# Patient Record
Sex: MALE | Race: BLACK OR AFRICAN AMERICAN | Employment: FULL TIME | URBAN - METROPOLITAN AREA
[De-identification: names, ages, dates, MRNs, and addresses within clinical notes are randomized per-mention and may not be internally consistent; named-entity substitution may affect disease eponyms.]

---

## 2019-07-25 ENCOUNTER — OFFICE VISIT (OUTPATIENT)
Dept: FAMILY MEDICINE CLINIC | Age: 34
End: 2019-07-25
Payer: COMMERCIAL

## 2019-07-25 VITALS
WEIGHT: 238 LBS | HEART RATE: 79 BPM | TEMPERATURE: 98.5 F | DIASTOLIC BLOOD PRESSURE: 68 MMHG | SYSTOLIC BLOOD PRESSURE: 110 MMHG | HEIGHT: 67 IN | RESPIRATION RATE: 16 BRPM | BODY MASS INDEX: 37.35 KG/M2

## 2019-07-25 DIAGNOSIS — A09 TRAVELER'S DIARRHEA: ICD-10-CM

## 2019-07-25 DIAGNOSIS — R10.11 RUQ PAIN: Primary | ICD-10-CM

## 2019-07-25 DIAGNOSIS — R10.11 RUQ PAIN: ICD-10-CM

## 2019-07-25 LAB
A/G RATIO: 1.3 (ref 1.1–2.2)
ALBUMIN SERPL-MCNC: 3.9 G/DL (ref 3.4–5)
ALP BLD-CCNC: 88 U/L (ref 40–129)
ALT SERPL-CCNC: 19 U/L (ref 10–40)
ANION GAP SERPL CALCULATED.3IONS-SCNC: 13 MMOL/L (ref 3–16)
AST SERPL-CCNC: 14 U/L (ref 15–37)
BASOPHILS ABSOLUTE: 0.1 K/UL (ref 0–0.2)
BASOPHILS RELATIVE PERCENT: 0.9 %
BILIRUB SERPL-MCNC: <0.2 MG/DL (ref 0–1)
BUN BLDV-MCNC: 15 MG/DL (ref 7–20)
CALCIUM SERPL-MCNC: 9.4 MG/DL (ref 8.3–10.6)
CHLORIDE BLD-SCNC: 103 MMOL/L (ref 99–110)
CO2: 24 MMOL/L (ref 21–32)
CREAT SERPL-MCNC: 0.9 MG/DL (ref 0.9–1.3)
EOSINOPHILS ABSOLUTE: 0.2 K/UL (ref 0–0.6)
EOSINOPHILS RELATIVE PERCENT: 1.9 %
GFR AFRICAN AMERICAN: >60
GFR NON-AFRICAN AMERICAN: >60
GLOBULIN: 3.1 G/DL
GLUCOSE BLD-MCNC: 92 MG/DL (ref 70–99)
HCT VFR BLD CALC: 43.2 % (ref 40.5–52.5)
HEMOGLOBIN: 14.4 G/DL (ref 13.5–17.5)
LYMPHOCYTES ABSOLUTE: 1.3 K/UL (ref 1–5.1)
LYMPHOCYTES RELATIVE PERCENT: 14.5 %
MCH RBC QN AUTO: 29.4 PG (ref 26–34)
MCHC RBC AUTO-ENTMCNC: 33.2 G/DL (ref 31–36)
MCV RBC AUTO: 88.6 FL (ref 80–100)
MONOCYTES ABSOLUTE: 1.2 K/UL (ref 0–1.3)
MONOCYTES RELATIVE PERCENT: 13.1 %
NEUTROPHILS ABSOLUTE: 6.4 K/UL (ref 1.7–7.7)
NEUTROPHILS RELATIVE PERCENT: 69.6 %
PDW BLD-RTO: 13.1 % (ref 12.4–15.4)
PLATELET # BLD: 326 K/UL (ref 135–450)
PMV BLD AUTO: 8.6 FL (ref 5–10.5)
POTASSIUM SERPL-SCNC: 3.7 MMOL/L (ref 3.5–5.1)
RBC # BLD: 4.88 M/UL (ref 4.2–5.9)
SODIUM BLD-SCNC: 140 MMOL/L (ref 136–145)
TOTAL PROTEIN: 7 G/DL (ref 6.4–8.2)
WBC # BLD: 9.2 K/UL (ref 4–11)

## 2019-07-25 PROCEDURE — 99213 OFFICE O/P EST LOW 20 MIN: CPT | Performed by: FAMILY MEDICINE

## 2019-07-25 RX ORDER — CIPROFLOXACIN 500 MG/1
500 TABLET, FILM COATED ORAL 2 TIMES DAILY
Qty: 14 TABLET | Refills: 0 | Status: SHIPPED | OUTPATIENT
Start: 2019-07-25 | End: 2019-08-01

## 2019-07-25 ASSESSMENT — PATIENT HEALTH QUESTIONNAIRE - PHQ9
SUM OF ALL RESPONSES TO PHQ QUESTIONS 1-9: 0
2. FEELING DOWN, DEPRESSED OR HOPELESS: 0
1. LITTLE INTEREST OR PLEASURE IN DOING THINGS: 0
SUM OF ALL RESPONSES TO PHQ QUESTIONS 1-9: 0
SUM OF ALL RESPONSES TO PHQ9 QUESTIONS 1 & 2: 0

## 2019-07-25 NOTE — PROGRESS NOTES
tablet by mouth 2 times daily for 7 days Yes [place], MD   ibuprofen (ADVIL;MOTRIN) 800 MG tablet Take 1 tablet by mouth every 8 hours as needed for Pain Yes Luci Shukla PA-C      No family history on file. Social History     Tobacco Use    Smoking status: Never Smoker    Smokeless tobacco: Never Used    Tobacco comment: advised not to start   Substance Use Topics    Alcohol use: Not on file    Drug use: No      LAST LABS  No results found for: CHOL, LDLCALCNo results found for: HDLNo results found for: TRIG  No results found for: GLUCOSE  No results found for: NA, K, CREATININE  No results found for: WBC, HGB, HCT, MCV, PLT  No results found for: ALT, AST, GGT, ALKPHOS, BILITOT  No results found for: TSH  No results found for: LABA1C   Objective:    PHYSICAL EXAM   /68 (Site: Right Upper Arm, Position: Sitting, Cuff Size: Large Adult)   Pulse 79   Temp 98.5 °F (36.9 °C) (Oral)   Resp 16   Ht 5' 7\" (1.702 m)   Wt 238 lb (108 kg)   BMI 37.28 kg/m²   BP Readings from Last 5 Encounters:   07/25/19 110/68   02/12/18 (!) 142/76   11/04/15 120/70   09/16/15 112/70   09/03/14 118/76     Wt Readings from Last 5 Encounters:   07/25/19 238 lb (108 kg)   02/12/18 227 lb 15.3 oz (103.4 kg)   11/04/15 223 lb (101.2 kg)   09/16/15 220 lb (99.8 kg)   09/03/14 224 lb (101.6 kg)      GENERAL:   · well-developed, well-nourished, alert, no distress. EYES:   · External findings: lids and lashes normal and conjunctivae and sclerae normal  · Eyes: no periorbital cellulitis. ENT:   · External nose and ears appear normal  LUNGS:    · Breathing unlabored  · clear to auscultation bilaterally and good air movement  CARDIOVASC:   · regular rate and rhythm, S1, S2 normal. No murmurs noted. · Apical impulse normal  ABDOMEN:   · Soft, mild-tender RUQ, no masses  · No hepatosplenomegaly  · No hernias noted.     SKIN: warm and dry  · No rashes or suspicious lesions  · No nodules or induration     Assessment and Plan:

## 2019-07-26 LAB
REASON FOR REJECTION: NORMAL
REJECTED TEST: NORMAL

## 2019-07-29 ENCOUNTER — TELEPHONE (OUTPATIENT)
Dept: FAMILY MEDICINE CLINIC | Age: 34
End: 2019-07-29

## 2019-07-29 NOTE — TELEPHONE ENCOUNTER
Called pt and he said since taking the antibiotics he is feeling 100% better.   He will finish the antibiotic and if he starts feeling sick when it's done he will call us back and repeat blood test.

## 2019-08-29 PROBLEM — K63.3 ULCERATION OF COLON: Status: ACTIVE | Noted: 2019-08-29

## 2020-02-12 ENCOUNTER — TELEPHONE (OUTPATIENT)
Dept: FAMILY MEDICINE CLINIC | Age: 35
End: 2020-02-12

## 2020-12-07 ENCOUNTER — TELEPHONE (OUTPATIENT)
Dept: FAMILY MEDICINE CLINIC | Age: 35
End: 2020-12-07

## 2020-12-07 NOTE — TELEPHONE ENCOUNTER
Called pt and he said his sperm is a pinkish color, it has been this way for a month or so. He isn't really sure. Pt is having no pain. He has no burning or pain at all. No blood in his urine or anything only when he ejaculates.

## 2020-12-07 NOTE — TELEPHONE ENCOUNTER
Pt calling states he has a concern and would a like a call from Dr. Jorge Vides   No other information given   Please advise

## 2020-12-07 NOTE — TELEPHONE ENCOUNTER
This can be a benign condition but best to see urology for evaluation. Edyta Sequeira M.D. Axel Virgen M.D. Hi Hernandez M.D.  Ktahe Mcdowell M.D. [facility]. Victoria Pisano M.D. [facility]  [id number] [address]  [phone] (Phone)  [phone] (Fax)

## 2021-02-03 ENCOUNTER — OFFICE VISIT (OUTPATIENT)
Dept: FAMILY MEDICINE CLINIC | Age: 36
End: 2021-02-03
Payer: COMMERCIAL

## 2021-02-03 VITALS
SYSTOLIC BLOOD PRESSURE: 112 MMHG | HEIGHT: 67 IN | BODY MASS INDEX: 37.2 KG/M2 | WEIGHT: 237 LBS | TEMPERATURE: 98.4 F | HEART RATE: 78 BPM | OXYGEN SATURATION: 98 % | DIASTOLIC BLOOD PRESSURE: 68 MMHG | RESPIRATION RATE: 16 BRPM

## 2021-02-03 DIAGNOSIS — Z13.1 SCREENING FOR DIABETES MELLITUS: ICD-10-CM

## 2021-02-03 DIAGNOSIS — Z00.00 WELL ADULT HEALTH CHECK: Primary | ICD-10-CM

## 2021-02-03 DIAGNOSIS — Z13.220 SCREENING, LIPID: ICD-10-CM

## 2021-02-03 DIAGNOSIS — R06.83 SNORING: ICD-10-CM

## 2021-02-03 DIAGNOSIS — Z11.59 NEED FOR HEPATITIS C SCREENING TEST: ICD-10-CM

## 2021-02-03 DIAGNOSIS — K50.90 CROHN'S DISEASE WITHOUT COMPLICATION, UNSPECIFIED GASTROINTESTINAL TRACT LOCATION (HCC): ICD-10-CM

## 2021-02-03 DIAGNOSIS — R40.0 DAYTIME SOMNOLENCE: ICD-10-CM

## 2021-02-03 PROCEDURE — 99395 PREV VISIT EST AGE 18-39: CPT | Performed by: FAMILY MEDICINE

## 2021-02-03 PROCEDURE — G8484 FLU IMMUNIZE NO ADMIN: HCPCS | Performed by: FAMILY MEDICINE

## 2021-02-03 SDOH — ECONOMIC STABILITY: FOOD INSECURITY: WITHIN THE PAST 12 MONTHS, YOU WORRIED THAT YOUR FOOD WOULD RUN OUT BEFORE YOU GOT MONEY TO BUY MORE.: PATIENT DECLINED

## 2021-02-03 ASSESSMENT — PATIENT HEALTH QUESTIONNAIRE - PHQ9: 2. FEELING DOWN, DEPRESSED OR HOPELESS: 0

## 2021-02-03 NOTE — PROGRESS NOTES
PHYSICAL-VISIT NOTE   Subjective:     Chief Complaint   Patient presents with    Annual Exam       Александр Montes is a 28 y.o. male who presents for annual testing/preventive review and check-up of medical problems listed below:  1. Well adult health check    2. Need for hepatitis C screening test    3. Screening for diabetes mellitus    4. Screening, lipid    5. Daytime somnolence    6. Snoring    7. Crohn's disease without complication, unspecified gastrointestinal tract location [facility])        Complaints: pt is doing well no new issues   Health Maintenance Due   Topic Date Due    Hepatitis C screen  1985     Has new insurance so may be able to pursue Tx for Crohn's. Last flare 6 weeks ago. Has yellow stones in back of throat. He scrapes them off. Sleep non-restorative. Told he snores. ROS  See scanned \"Annual Adult Health Checklist\" reviewed by Lonia Dance, MD. Pertinent positives addressed above. *Chief complaint, HPI and History provided by the medical assistant has been reviewed and verified by provider- Lonia Dance MD.    HISTORY:  Patient's medications, allergies, past medical, and social histories were reviewed and updated as appropriate. CHART REVIEW  Health Maintenance   Topic Date Due    Hepatitis C screen  1985    Flu vaccine (1) 02/03/2022 (Originally 9/1/2020)    DTaP/Tdap/Td vaccine (2 - Td) 09/03/2024    Hepatitis A vaccine  Aged Out    Hepatitis B vaccine  Aged Out    Hib vaccine  Aged Out    Meningococcal (ACWY) vaccine  Aged Out    Pneumococcal 0-64 years Vaccine  Aged Out    Varicella vaccine  Discontinued    HIV screen  Discontinued     The ASCVD Risk score (Shea Couch, et al., 2013) failed to calculate for the following reasons: The 2013 ASCVD risk score is only valid for ages 36 to 78  Prior to Visit Medications    Not on File      No family history on file.   Social History     Tobacco Use    Smoking status: Never Smoker  Smokeless tobacco: Never Used    Tobacco comment: advised not to start   Substance Use Topics    Alcohol use: Not on file    Drug use: No      LAST LABS  No results found for: CHOL, LDLCALCNo results found for: HDLNo results found for: TRIG  Lab Results   Component Value Date    GLUCOSE 92 07/25/2019     Lab Results   Component Value Date     07/25/2019    K 3.7 07/25/2019    CREATININE 0.9 07/25/2019     Lab Results   Component Value Date    WBC 9.2 07/25/2019    HGB 14.4 07/25/2019    HCT 43.2 07/25/2019    MCV 88.6 07/25/2019     07/25/2019     Lab Results   Component Value Date    ALT 19 07/25/2019    AST 14 (L) 07/25/2019    ALKPHOS 88 07/25/2019    BILITOT <0.2 07/25/2019     No results found for: TSH  No results found for: LABA1C  Objective:   PHYSICAL EXAM   /68 (Site: Right Upper Arm, Position: Sitting, Cuff Size: Large Adult)   Pulse 78   Temp 98.4 °F (36.9 °C) (Temporal)   Resp 16   Ht 5' 7\" (1.702 m)   Wt 237 lb (107.5 kg)   SpO2 98%   BMI 37.12 kg/m²   BP Readings from Last 5 Encounters:   02/03/21 112/68   07/25/19 110/68   02/12/18 (!) 142/76   11/04/15 120/70   09/16/15 112/70     Wt Readings from Last 5 Encounters:   02/03/21 237 lb (107.5 kg)   07/25/19 238 lb (108 kg)   02/12/18 227 lb 15.3 oz (103.4 kg)   11/04/15 223 lb (101.2 kg)   09/16/15 220 lb (99.8 kg)      GENERAL:   · well-developed, well-nourished, alert, no distress. EYES:   · External findings: lids and lashes normal and conjunctivae and sclerae normal  · Eyes: no periorbital cellulitis.   ENT:   · External nose and ears appear normal  · normal TM's and external ear canals both ears  · Pharynx: normal. Exudates: None  · Lips, mucosa, and tongue normal  · Hearing grossly normal.     NECK:   · Supple, symmetrical, trachea midline  · Thyroid not enlarged, symmetric, no tenderness/mass/nodules  LYMPH:  · no cervical nodes, no supraclavicular nodes  LUNGS:    · Breathing unlabored

## 2021-02-03 NOTE — PATIENT INSTRUCTIONS
INSTRUCTIONS  · NEXT APPOINTMENT: Please schedule annual complete physical (30 minutes) in one year. · PLEASE TAKE THIS FORM TO CHECK-OUT WINDOW TO SCHEDULE NEXT VISIT. PLEASE GET FASTING BLOODWORK DRAWN SOON. Lab is on first floor in [address]. Hours Monday to Friday 7 AM to 5 PM.   · See GI (Dr. Chelsy Scott) soon to discuss treatment options. · If tonsil stones get too irritating can refer to ENT for tonsillectomy. · See sleep specialist.  · For allergies, patient may take OTC antihistamines such as Claritin/Allovert/loratidine or Zyrtec/certrizine 10 mg daily. If that does not work, add daily Flonase or Nasacort nasal spray. Patient Education       SLEEP APNEA    Overview   What is sleep apnea? Sleep apnea is a serious sleep disorder. People who have sleep apnea stop breathing for 10 seconds to 30 seconds at a time while they are sleeping. These short stops in breathing can happen up to 400 times every night. If you have sleep apnea, periods of not breathing can disturb your sleep (even if they don't fully wake you up). Is sleep apnea common? It is estimated that more than 12 million Americans have sleep apnea. Men, people who are overweight, and people who are older than 36years of age are more likely to have sleep apnea. However, it can affect anyone at any age. If you are interested in meeting other people who have sleep apnea, you can visit the American Sleep Apnea Association's website to find the location of a support group near you. Will this problem change my life? Actually, sleep apnea may already have affected you more than you know. Chances are things will improve for you once the diagnosis is made and you start treatment. Whatever your treatment, remember that you are not alone and help is available. Symptoms   How do I know if I have sleep apnea? Because some of the symptoms of sleep apnea occur while you're sleeping, your bed partner may notice it first. You, or that person, may notice heavy snoring or long pauses in your breathing during sleep. Even if you don't remember waking up during the night, you may notice daytime sleepiness (such as falling asleep at work, while driving or when talking), irritability or fatigue. You may also experience morning headaches, forgetfulness, mood changes and a decreased interest in sex. Causes & Risk Factors   What causes sleep apnea? There are 2 kinds of sleep apnea: obstructive apnea and central apnea. Obstructive sleep apnea is the most common type. Nine out of 10 people who have sleep apnea have this type of apnea. If you have obstructive apnea, something is blocking the airway that brings air into your body (also called the trachea). When you try to breathe, you can't get enough air because of the blockage. Your airway might be blocked by your tongue, tonsils or uvula (the little piece of flesh that hangs down in the back of your throat). It might also be blocked by a large amount of fatty tissue in the throat or by relaxed throat muscles. Central sleep apnea is less common. This type of sleep apnea is related to the function of the central nervous system. If you have this type of apnea, the muscles you use to breathe don't get the \"go-ahead\" signal from your brain. Either the brain doesn't send the signal, or the signal gets interrupted. Diagnosis & Tests   How is sleep apnea diagnosed? Crohn's disease is a lifelong inflammatory bowel disease. Parts of the digestive tract get swollen and irritated and may develop sores called ulcers. It usually occurs in the last part of the small intestine and the first part of the large intestine. The main symptoms of Crohn's disease are belly pain, diarrhea, fever, and weight loss. It sometimes causes problems with joints, eyes, or skin. Symptoms may be mild or severe. The disease can also go into remission (not be active). Bad attacks are often treated in the hospital with medicines and liquids through a tube in your vein (IV). Talk with your doctor about the best treatments for you. You may need medicines that help prevent or treat flare-ups. You may need surgery to remove part of your bowel if you have an abnormal opening in the bowel (fistula), an abscess, or an obstruction. Self-care can help reduce your symptoms. Follow-up care is a key part of your treatment and safety. Be sure to make and go to all appointments, and call your doctor if you are having problems. It's also a good idea to know your test results and keep a list of the medicines you take. How can you care for yourself at home? · Take your medicines exactly as prescribed. Call your doctor if you think you are having a problem with your medicine. You will get more details on the specific medicines your doctor prescribes. · Do not take anti-inflammatory medicines, such as aspirin, ibuprofen (Advil, Motrin), or naproxen (Aleve). They may make your symptoms worse. Do not take any other medicines or herbal products without talking to your doctor first.  · Avoid foods that make your symptoms worse. These might include milk, alcohol, high-fiber foods, or spicy foods. · Eat a healthy diet. Make sure to get enough iron. Rectal bleeding may make you lose iron. Good sources of iron include beef, lentils, spinach, raisins, and iron-enriched breads and cereals. · Drink liquid meal replacements if your doctor recommends them. These are high in calories and contain vitamins and minerals. Severe symptoms may make it hard for your body to absorb vitamins and minerals. · Do not smoke. Smoking makes Crohn's disease worse. If you need help quitting, talk to your doctor about stop-smoking programs and medicines. These can increase your chances of quitting for good. · Seek support from friends and family to help cope with Crohn's disease. The illness can affect all parts of your life. Get counseling if you need it. When should you call for help? Call 911 anytime you think you may need emergency care. For example, call if:    · Your stools are maroon or very bloody.     · You passed out (lost consciousness). Call your doctor now or seek immediate medical care if:    · You are vomiting.     · You have new or worse belly pain.     · You have a fever.     · You cannot pass stools or gas.     · You have new or more blood in your stools. Watch closely for changes in your health, and be sure to contact your doctor if:    · You have new or worse symptoms.     · You are losing weight.     · You do not get better as expected. Where can you learn more? Go to [email or web address]. org and sign in to your [employer] account. Enter 21 [phone] in the [employer] box to learn more about \"Crohn's Disease: Care Instructions. \"     If you do not have an account, please click on the \"Sign Up Now\" link. Current as of: April 15, 2020               Content Version: 12.6  © 3416-4371 [employer]. Care instructions adapted under license by [facility] ([facility]). If you have questions about a medical condition or this instruction, always ask your healthcare professional. [place] [address] any warranty or liability for your use of this information.

## 2021-02-10 ENCOUNTER — OFFICE VISIT (OUTPATIENT)
Dept: SLEEP MEDICINE | Age: 36
End: 2021-02-10
Payer: COMMERCIAL

## 2021-02-10 VITALS
RESPIRATION RATE: 14 BRPM | DIASTOLIC BLOOD PRESSURE: 73 MMHG | SYSTOLIC BLOOD PRESSURE: 118 MMHG | BODY MASS INDEX: 37.04 KG/M2 | WEIGHT: 236 LBS | OXYGEN SATURATION: 97 % | TEMPERATURE: 98.6 F | HEART RATE: 77 BPM | HEIGHT: 67 IN

## 2021-02-10 DIAGNOSIS — G47.33 OBSTRUCTIVE SLEEP APNEA: Primary | ICD-10-CM

## 2021-02-10 DIAGNOSIS — E66.9 OBESITY (BMI 35.0-39.9 WITHOUT COMORBIDITY): ICD-10-CM

## 2021-02-10 PROCEDURE — G8427 DOCREV CUR MEDS BY ELIG CLIN: HCPCS | Performed by: PSYCHIATRY & NEUROLOGY

## 2021-02-10 PROCEDURE — 1036F TOBACCO NON-USER: CPT | Performed by: PSYCHIATRY & NEUROLOGY

## 2021-02-10 PROCEDURE — 99204 OFFICE O/P NEW MOD 45 MIN: CPT | Performed by: PSYCHIATRY & NEUROLOGY

## 2021-02-10 PROCEDURE — G8417 CALC BMI ABV UP PARAM F/U: HCPCS | Performed by: PSYCHIATRY & NEUROLOGY

## 2021-02-10 PROCEDURE — G8484 FLU IMMUNIZE NO ADMIN: HCPCS | Performed by: PSYCHIATRY & NEUROLOGY

## 2021-02-10 ASSESSMENT — ENCOUNTER SYMPTOMS
CHOKING: 1
EYES NEGATIVE: 1
ALLERGIC/IMMUNOLOGIC NEGATIVE: 1
GASTROINTESTINAL NEGATIVE: 1
APNEA: 1

## 2021-02-10 ASSESSMENT — SLEEP AND FATIGUE QUESTIONNAIRES
HOW LIKELY ARE YOU TO NOD OFF OR FALL ASLEEP WHILE SITTING INACTIVE IN A PUBLIC PLACE: 1
HOW LIKELY ARE YOU TO NOD OFF OR FALL ASLEEP WHILE SITTING QUIETLY AFTER LUNCH WITHOUT ALCOHOL: 2
HOW LIKELY ARE YOU TO NOD OFF OR FALL ASLEEP IN A CAR, WHILE STOPPED FOR A FEW MINUTES IN TRAFFIC: 0
HOW LIKELY ARE YOU TO NOD OFF OR FALL ASLEEP WHILE WATCHING TV: 2
HOW LIKELY ARE YOU TO NOD OFF OR FALL ASLEEP WHEN YOU ARE A PASSENGER IN A CAR FOR AN HOUR WITHOUT A BREAK: 1
ESS TOTAL SCORE: 10

## 2021-02-10 NOTE — PROGRESS NOTES
MD RONALDO Parson Board Certified in Sleep Medicine  Certified [facility] Sleep Medicine  Board Certified in Neurology [address]  [address]  [address], [address]           [address]  382 [facility] [address] [phone]    Subjective:     Patient ID: Melody Whalen is a 28 y.o. male. Chief Complaint   Patient presents with    New Patient       HPI:        Melody Whalen is a 28 y.o. male referred by Dr. Asif Ceja for a sleep evaluation. He complains of snoring, snorting, choking, periods of not breathing, tossing and turning, excessive daytime sleepiness, feels sleepy during the day but he denies knees buckling with laughing, completely or partially paralyzed while falling asleep or waking up, noisy environment, uncomfortable room temperature, uncomfortable bedding. Symptoms began several years ago, gradually worsening since that time. The patient's bed-partner confirmed the snoring and stopped breathing at night  SLEEP SCHEDULE: Goes to bed around 9-11 PM in the weekdays and 9-11 PM in the weekends. It usually takes the patient few minutes to fall asleep. The patient gets up 1 per night to go to the bathroom. The Patient finally gets up at 3-6 AM during the weekdays and 7-8 AM in the weekends. patient wakes up with  sometimes morning headache. . the headache usually dull headache lasts 30-60 minutes. The patient has restless sleep with frequent arousals in addition to the Patient has significant daytime sleepiness. The Patient scored Total score: 10 on Vader Sleepiness Scale ( more than 10 is indicative of daytime sleepiness)and 12 in fatigue scale ( more than 36 is indicative of daytime fatigue). The patient takes no naps. Previous evaluation and treatment has included- none. The Patient has been obese for many years and tried, has gained 20-25 in the last 5 years,  unsuccessfully to lose weight through diet, exercise. 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  FAA/'santa - N/A      Previous Report(s) Reviewed: historical medical records       Social History     Socioeconomic History    Marital status:      Spouse name: Not on file    Number of children: Not on file    Years of education: Not on file    Highest education level: Not on file   Occupational History    Not on file   Social Needs    Financial resource strain: Not hard at all   [employer] insecurity     Worry: Patient refused     Inability: Patient refused    Transportation needs     Medical: Patient refused     Non-medical: Patient refused   Tobacco Use    Smoking status: Never Smoker    Smokeless tobacco: Never Used    Tobacco comment: advised not to start   Substance and Sexual Activity    Alcohol use: Never     Frequency: Never    Drug use: No    Sexual activity: Not on file   Lifestyle    Physical activity     Days per week: Not on file     Minutes per session: Not on file    Stress: Not on file   Relationships    Social connections     Talks on phone: Not on file     Gets together: Not on file     Attends Taoist service: Not on file     Active member of club or organization: Not on file     Attends meetings of clubs or organizations: Not on file     Relationship status: Not on file    Intimate partner violence     Fear of current or ex partner: Not on file     Emotionally abused: Not on file     Physically abused: Not on file     Forced sexual activity: Not on file   Other Topics Concern    Not on file   Social History Narrative    Lives with spouse daughter x 4 and son x 1.     Exercise: cardiovascular equipment three times a week    Seatbelt use: Always       Prior to Admission medications    Not on File       Allergies as of 02/10/2021    (No Known Allergies) Patient Active Problem List   Diagnosis    Crohn disease [facility])       Past Medical History:   Diagnosis Date    Crohn disease ([facility] [address] 8/29/2019       History reviewed. No pertinent surgical history. History reviewed. No pertinent family history. Review of Systems   Constitutional: Positive for unexpected weight change. Negative for fatigue. HENT: Negative. Eyes: Negative. Respiratory: Positive for apnea and choking. Cardiovascular: Negative. Negative for leg swelling. Gastrointestinal: Negative. Endocrine: Negative. Genitourinary: Positive for frequency (1). Musculoskeletal: Negative. Skin: Negative. Allergic/Immunologic: Negative. Neurological: Positive for headaches (AM). Hematological: Negative. Psychiatric/Behavioral: Negative. Negative for agitation and dysphoric mood. Objective:     Vitals:  Weight BMI Neck circumference    Wt Readings from Last 3 Encounters:   02/10/21 236 lb (107 kg)   02/03/21 237 lb (107.5 kg)   07/25/19 238 lb (108 kg)    Body mass index is 36.96 kg/m². Neck circumference: 16.5     BP HR SaO2   BP Readings from Last 3 Encounters:   02/10/21 118/73   02/03/21 112/68   07/25/19 110/68    Pulse Readings from Last 3 Encounters:   02/10/21 77   02/03/21 78   07/25/19 79    SpO2 Readings from Last 3 Encounters:   02/10/21 97%   02/03/21 98%   02/12/18 99%        The mandibular molar Class :   [x]1 []2 []3      Mallampati I Airway Classification:   []1 [x]2 []3 []4        Physical Exam  Vitals signs and nursing note reviewed. Constitutional:       Appearance: Normal appearance. HENT:      Head: Atraumatic. Nose: Nose normal.      Mouth/Throat:      Comments: Mallampati class 2, no retrognathia or hypognathia , normal airflow in bilateral nostrils, no septum deviation , crowded oropharynx with low soft palate, high arched hard palate,no tonsils enlargement. Eyes:      Extraocular Movements: Extraocular movements intact.    Neck: Musculoskeletal: Normal range of motion and neck supple. Cardiovascular:      Rate and Rhythm: Normal rate and regular rhythm. Heart sounds: Normal heart sounds. Pulmonary:      Effort: Pulmonary effort is normal.      Breath sounds: Normal breath sounds. Musculoskeletal: Normal range of motion. Skin:     General: Skin is warm. Neurological:      General: No focal deficit present. Psychiatric:         Mood and Affect: Mood normal.         Assessment:   Obstructive sleep apnea especially with snoring, snorting,  observed apnea, daytime sleepiness, large neck circumference, Mallampati class of 2 and obesity. Diagnosis Orders   1. Obstructive sleep apnea  Home Sleep Study    Sleep Study with PAP Titration   2. Obesity (BMI 35.0-39.9 without comorbidity)  Home Sleep Study     Plan:     Patient was counseled about the pathophysiology of obstructive sleep apnea syndrome and the methods for evaluating its presence and severity. Patient was counseled to avoid driving and other potentially hazardous circumstances if the patient is experiencing excessive sleepiness. Treatment considerations include the use of nasal CPAP, oral dental appliance or a surgical intervention, which should be based on otolarygologic findings, In the meantime, the patient should be cautioned to avoid the use of alcohol or other depressant medications because of potential for increasing the duration and severity of apnea and cautioned regarding driving or operating and dangerous equipment if the patient is experiencing daytime sleepiness. .      We discussed the proportionality between weight and AHI. With 10% weight change, the AHI has a 27% proportionate change. With 20% weight change, the AHI has a 45-50% proportionate change.    .     Orders Placed This Encounter   Procedures    Home Sleep Study    Sleep Study with PAP Titration Return in about 3 months (around 5/10/2021) for Reveiwing CPAP usage and compliance report and tro.     Rubia Salvador MD  Medical Director [facility]

## 2021-02-10 NOTE — PATIENT INSTRUCTIONS
· Sleep on your side. It may stop mild apnea. If you tend to roll onto your back, sew a pocket in the back of your pajama top. Put a tennis ball into the pocket, and stitch the pocket shut. This will help keep you from sleeping on your back. · Avoid alcohol and medicines such as sleeping pills and sedatives before bed. · Do not smoke. Smoking can make sleep apnea worse. If you need help quitting, talk to your doctor about stop-smoking programs and medicines. These can increase your chances of quitting for good. · Prop up the head of your bed 4 to 6 inches by putting bricks under the legs of the bed. · Treat breathing problems, such as a stuffy nose, caused by a cold or allergies. · Try a continuous positive airway pressure (CPAP) breathing machine if your doctor recommends it. The machine keeps your airway open when you sleep. · If CPAP does not work for you, ask your doctor if you can try other breathing machines. A bilevel positive airway pressure machine uses one type of air pressure for breathing in and another type for breathing out. Another device raises or lowers air pressure as needed while you breathe. · Talk to your doctor if:  ? Your nose feels dry or bleeds when you use one of these machines. You may need to increase moisture in the air. A humidifier may help. ? Your nose is runny or stuffy from using a breathing machine. Decongestants or a corticosteroid nasal spray may help. ? You are sleepy during the day and it gets in the way of the normal things you do. Do not drive when you are drowsy. When should you call for help? Watch closely for changes in your health, and be sure to contact your doctor if:    · You still have sleep apnea even though you have made lifestyle changes.     · You are thinking of trying a device such as CPAP.     · You are having problems using a CPAP or similar machine. Where can you learn more? Go to [email or web address] and sign in to your [employer] account. Enter P326 in the [place] box to learn more about \"Sleep Apnea: Care Instructions. \"     If you do not have an account, please click on the \"Sign Up Now\" link. Current as of: February 24, 2020               Content Version: 12.6  © 8557-0159 [email or web address], [facility]. Care instructions adapted under license by [facility] ([facility]). If you have questions about a medical condition or this instruction, always ask your healthcare professional. [address] any warranty or liability for your use of this information.

## 2021-02-19 ENCOUNTER — HOSPITAL ENCOUNTER (OUTPATIENT)
Dept: SLEEP CENTER | Age: 36
Discharge: HOME | End: 2021-02-19
Payer: COMMERCIAL

## 2021-02-19 DIAGNOSIS — G47.33 OBSTRUCTIVE SLEEP APNEA: ICD-10-CM

## 2021-02-19 DIAGNOSIS — E66.9 OBESITY (BMI 35.0-39.9 WITHOUT COMORBIDITY): ICD-10-CM

## 2021-02-19 PROCEDURE — 95806 SLEEP STUDY UNATT&RESP EFFT: CPT

## 2021-02-19 PROCEDURE — 95806 SLEEP STUDY UNATT&RESP EFFT: CPT | Performed by: PSYCHIATRY & NEUROLOGY

## 2021-02-25 ENCOUNTER — TELEPHONE (OUTPATIENT)
Dept: SLEEP MEDICINE | Age: 36
End: 2021-02-25

## 2021-03-02 ENCOUNTER — OFFICE VISIT (OUTPATIENT)
Dept: FAMILY MEDICINE CLINIC | Age: 36
End: 2021-03-02
Payer: COMMERCIAL

## 2021-03-02 VITALS
DIASTOLIC BLOOD PRESSURE: 76 MMHG | WEIGHT: 235.2 LBS | RESPIRATION RATE: 16 BRPM | BODY MASS INDEX: 36.91 KG/M2 | OXYGEN SATURATION: 97 % | TEMPERATURE: 97.7 F | HEART RATE: 92 BPM | HEIGHT: 67 IN | SYSTOLIC BLOOD PRESSURE: 124 MMHG

## 2021-03-02 DIAGNOSIS — L03.818 CELLULITIS OF OTHER SPECIFIED SITE: Primary | ICD-10-CM

## 2021-03-02 PROCEDURE — 99213 OFFICE O/P EST LOW 20 MIN: CPT | Performed by: INTERNAL MEDICINE

## 2021-03-02 PROCEDURE — 1036F TOBACCO NON-USER: CPT | Performed by: INTERNAL MEDICINE

## 2021-03-02 PROCEDURE — G8427 DOCREV CUR MEDS BY ELIG CLIN: HCPCS | Performed by: INTERNAL MEDICINE

## 2021-03-02 PROCEDURE — 96372 THER/PROPH/DIAG INJ SC/IM: CPT | Performed by: INTERNAL MEDICINE

## 2021-03-02 PROCEDURE — G8484 FLU IMMUNIZE NO ADMIN: HCPCS | Performed by: INTERNAL MEDICINE

## 2021-03-02 PROCEDURE — G8417 CALC BMI ABV UP PARAM F/U: HCPCS | Performed by: INTERNAL MEDICINE

## 2021-03-02 RX ORDER — CEFTRIAXONE 500 MG/1
1000 INJECTION, POWDER, FOR SOLUTION INTRAMUSCULAR; INTRAVENOUS ONCE
Status: COMPLETED | OUTPATIENT
Start: 2021-03-02 | End: 2021-03-02

## 2021-03-02 RX ORDER — DOXYCYCLINE HYCLATE 100 MG
100 TABLET ORAL 2 TIMES DAILY
Qty: 20 TABLET | Refills: 0 | Status: SHIPPED | OUTPATIENT
Start: 2021-03-02 | End: 2021-03-12

## 2021-03-02 RX ADMIN — CEFTRIAXONE 1000 MG: 500 INJECTION, POWDER, FOR SOLUTION INTRAMUSCULAR; INTRAVENOUS at 17:48

## 2021-03-02 ASSESSMENT — ENCOUNTER SYMPTOMS
NAUSEA: 0
SHORTNESS OF BREATH: 0
VOMITING: 0
COUGH: 0

## 2021-03-02 NOTE — PROGRESS NOTES
3/2/2021    Chief Complaint   Patient presents with    Skin Problem     rt forearm. red swollen. warm to touch. red streak. x 2days. itches. HPI  Pt and financee woke up with red marks on his skin  Started yesterday when he woke up  Little painful  No fever or chills  No swelling in the armpit    Never had a staph or strep infection  Review of Systems   Constitutional: Negative for chills and fever. Respiratory: Negative for cough and shortness of breath. Gastrointestinal: Negative for nausea and vomiting. Skin: Negative for wound. Itching and not too painful on the arm         Health Maintenance   Topic Date Due    Hepatitis C screen  Never done    Flu vaccine (1) 02/03/2022 (Originally 9/1/2020)    DTaP/Tdap/Td vaccine (2 - Td) 09/03/2024    Hepatitis A vaccine  Aged Out    Hepatitis B vaccine  Aged Out    Hib vaccine  Aged Out    Meningococcal (ACWY) vaccine  Aged Out    Pneumococcal 0-64 years Vaccine  Aged Out    Varicella vaccine  Discontinued    HIV screen  Discontinued      Social History     Tobacco Use    Smoking status: Never Smoker    Smokeless tobacco: Never Used    Tobacco comment: advised not to start   Substance Use Topics    Alcohol use: Never     Frequency: Never    Drug use: No      No family history on file.   Prior to Visit Medications    Not on File     Patient Active Problem List   Diagnosis    Crohn disease ([facility] [address]    Obstructive sleep apnea        LABS:   Lab Results   Component Value Date    GLUCOSE 92 07/25/2019     Lab Results   Component Value Date     07/25/2019    K 3.7 07/25/2019    CREATININE 0.9 07/25/2019     No results found for: CHOL, LDLCALCNo results found for: HDLNo results found for: TRIG  Lab Results   Component Value Date    ALT 19 07/25/2019    AST 14 (L) 07/25/2019    ALKPHOS 88 07/25/2019    BILITOT <0.2 07/25/2019      Lab Results   Component Value Date    WBC 9.2 07/25/2019    HGB 14.4 07/25/2019    HCT 43.2 07/25/2019 MCV 88.6 07/25/2019     07/25/2019     No results found for: TSH  No results found for: LABA1C  No results found for: PSA, PSADIA     PHYSICAL EXAM:  /76 (Site: Left Upper Arm, Position: Sitting, Cuff Size: Large Adult)   Pulse 92   Temp 97.7 °F (36.5 °C) (Temporal)   Resp 16   Ht 5' 7\" (1.702 m)   Wt 235 lb 3.2 oz (106.7 kg)   SpO2 97%   BMI 36.84 kg/m²    Physical Exam  Constitutional:       Appearance: Normal appearance. He is obese. HENT:      Head: Normocephalic and atraumatic. Cardiovascular:      Rate and Rhythm: Normal rate and regular rhythm. Pulmonary:      Effort: Pulmonary effort is normal.      Breath sounds: Normal breath sounds. Skin:     General: Skin is warm and dry. Comments: Right forearm with induration and swelling  About  15 cm x 7-8 cm and streaking up the right arm  See photos  Not tender   Neurological:      Mental Status: He is alert. Psychiatric:         Mood and Affect: Mood normal.         Behavior: Behavior normal.         Thought Content: Thought content normal.         Judgment: Judgment normal.                   BP Readings from Last 5 Encounters:   03/02/21 124/76   02/10/21 118/73   02/03/21 112/68   07/25/19 110/68   02/12/18 (!) 142/76       Wt Readings from Last 5 Encounters:   03/02/21 235 lb 3.2 oz (106.7 kg)   02/10/21 236 lb (107 kg)   02/03/21 237 lb (107.5 kg)   07/25/19 238 lb (108 kg)   02/12/18 227 lb 15.3 oz (103.4 kg)      Gilson Gentile was seen today for skin problem. Diagnoses and all orders for this visit:    Cellulitis of other specified site  -     CBC Auto Differential; Future  -     Comprehensive Metabolic Panel; Future  -     cefTRIAXone (ROCEPHIN) injection 1,000 mg    Other orders  -     doxycycline hyclate (VIBRA-TABS) 100 MG tablet;  Take 1 tablet by mouth 2 times daily for 10 days    he has cellulitis  lymphangitis right arm  He needs to dewayne the skin  Fu in  2 days with Dr. Jill Bello

## 2021-03-04 ENCOUNTER — OFFICE VISIT (OUTPATIENT)
Dept: FAMILY MEDICINE CLINIC | Age: 36
End: 2021-03-04
Payer: COMMERCIAL

## 2021-03-04 VITALS
WEIGHT: 235 LBS | SYSTOLIC BLOOD PRESSURE: 136 MMHG | OXYGEN SATURATION: 98 % | DIASTOLIC BLOOD PRESSURE: 74 MMHG | BODY MASS INDEX: 36.81 KG/M2 | HEART RATE: 80 BPM | TEMPERATURE: 98.4 F | RESPIRATION RATE: 10 BRPM

## 2021-03-04 DIAGNOSIS — H65.92 SEROMUCINOUS OTITIS MEDIA OF LEFT EAR: ICD-10-CM
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
## 2021-03-04 DIAGNOSIS — L03.818 CELLULITIS OF OTHER SPECIFIED SITE: ICD-10-CM

## 2021-03-04 DIAGNOSIS — S50.861A INSECT BITE OF RIGHT FOREARM, INITIAL ENCOUNTER: Primary | ICD-10-CM

## 2021-03-04 PROCEDURE — G8484 FLU IMMUNIZE NO ADMIN: HCPCS | Performed by: FAMILY MEDICINE

## 2021-03-04 PROCEDURE — 99213 OFFICE O/P EST LOW 20 MIN: CPT | Performed by: FAMILY MEDICINE

## 2021-03-04 PROCEDURE — G8417 CALC BMI ABV UP PARAM F/U: HCPCS | Performed by: FAMILY MEDICINE

## 2021-03-04 PROCEDURE — G8427 DOCREV CUR MEDS BY ELIG CLIN: HCPCS | Performed by: FAMILY MEDICINE

## 2021-03-04 PROCEDURE — 1036F TOBACCO NON-USER: CPT | Performed by: FAMILY MEDICINE

## 2021-03-04 RX ORDER — PREDNISONE 20 MG/1
20 TABLET ORAL DAILY
Qty: 5 TABLET | Refills: 0 | Status: SHIPPED | OUTPATIENT
Start: 2021-03-04 | End: 2021-03-04 | Stop reason: SDUPTHER

## 2021-03-04 RX ORDER — PREDNISONE 20 MG/1
20 TABLET ORAL DAILY
Qty: 5 TABLET | Refills: 0 | Status: SHIPPED | OUTPATIENT
Start: 2021-03-04 | End: 2021-03-09

## 2021-03-04 NOTE — PROGRESS NOTES
FOLLOW-UP VISIT   Subjective:   HPI:   Chief Complaint   Patient presents with    Cellulitis     Rt arm cellulitis, got Rocephin injection on Tuesday    Patient here for follow-up of:  1. Insect bite of right forearm, initial encounter    2. Cellulitis of other specified site    3. Seromucinous otitis media of left ear       Complaints: buttox still sore from shot rocephin 2 d ago  Arm swelling down. Never fever or chills. No pain but itchy  Streak to the lower inner upper arm not progressed past 2 d    Feel water in L ear when lean certain way x 1 mo    Review of Systems   General ROS: fever? No,    Night sweats? No  Respiratory ROS: cough? No   Wheezing? No  Cardiovascular ROS: chest pain? No   Shortness of breath? No    HISTORY:  Patient's medications, allergies, past medical, and social histories were reviewed and updated as appropriate. CHART REVIEW  Health Maintenance   Topic Date Due    Hepatitis C screen  Never done    Flu vaccine (1) 02/03/2022 (Originally 9/1/2020)    DTaP/Tdap/Td vaccine (2 - Td) 09/03/2024    Hepatitis A vaccine  Aged Out    Hepatitis B vaccine  Aged Out    Hib vaccine  Aged Out    Meningococcal (ACWY) vaccine  Aged Out    Pneumococcal 0-64 years Vaccine  Aged Out    Varicella vaccine  Discontinued    HIV screen  Discontinued     The ASCVD Risk score (Kwasi Breath., et al., 2013) failed to calculate for the following reasons: The 2013 ASCVD risk score is only valid for ages 36 to 78  Prior to Visit Medications    Medication Sig Taking? Authorizing Provider   predniSONE (DELTASONE) 20 MG tablet Take 1 tablet by mouth daily for 5 days Yes Merlinda Erichsen, MD   doxycycline hyclate (VIBRA-TABS) 100 MG tablet Take 1 tablet by mouth 2 times daily for 10 days  Kaykay Raman MD      No family history on file.   Social History     Tobacco Use    Smoking status: Never Smoker    Smokeless tobacco: Never Used    Tobacco comment: advised not to start   Substance Use Topics  Alcohol use: Never     Frequency: Never    Drug use: No      LAST LABS  No results found for: CHOL, LDLCALCNo results found for: HDLNo results found for: TRIG  Lab Results   Component Value Date    GLUCOSE 92 07/25/2019     Lab Results   Component Value Date     07/25/2019    K 3.7 07/25/2019    CREATININE 0.9 07/25/2019     Lab Results   Component Value Date    WBC 9.2 07/25/2019    HGB 14.4 07/25/2019    HCT 43.2 07/25/2019    MCV 88.6 07/25/2019     07/25/2019     Lab Results   Component Value Date    ALT 19 07/25/2019    AST 14 (L) 07/25/2019    ALKPHOS 88 07/25/2019    BILITOT <0.2 07/25/2019     No results found for: TSH  No results found for: LABA1C  Objective:   PHYSICAL EXAM  /74 (Site: Right Upper Arm, Position: Sitting)   Pulse 80   Temp 98.4 °F (36.9 °C)   Resp 10   Wt 235 lb (106.6 kg)   SpO2 98%   BMI 36.81 kg/m²   BP Readings from Last 5 Encounters:   03/04/21 136/74   03/02/21 124/76   02/10/21 118/73   02/03/21 112/68   07/25/19 110/68     Wt Readings from Last 5 Encounters:   03/04/21 235 lb (106.6 kg)   03/02/21 235 lb 3.2 oz (106.7 kg)   02/10/21 236 lb (107 kg)   02/03/21 237 lb (107.5 kg)   07/25/19 238 lb (108 kg)      GENERAL:   · well-developed, well-nourished, alert, no distress. SKIN: warm and dry  · Rash about same area as 2 days ago, red, warm to touch   ENT- left canal clear, serous fluid middle ear     Assessment and Plan:      Diagnosis Orders   1. Insect bite of right forearm, initial encounter  predniSONE (DELTASONE) 20 MG tablet   2. Cellulitis of other specified site     3. Seromucinous otitis media of left ear     Plan as above and below. INSTRUCTIONS  Cold compresses  Please finish taking ALL of the antibiotics. Call for fever or chills. Take prednisone for 5 days. Will refer to ENT if ear not cleared up in a month.

## 2021-03-04 NOTE — PATIENT INSTRUCTIONS
Young children get more colds because it takes time for the immune system to be able to recognize and padgett off cold viruses. The fluid in OME is often thin and watery. It used to be thought that the longer the fluid was present, the thicker it became. (\"Glue ear\" is a common name given to OME with thick fluid.) However, it is now believed that the thickness of the fluid has more to do with the particular ear than with how long the fluid is present. Symptoms  Unlike children with an ear infection, children with OME do not act sick. OME often does not have obvious symptoms. Older children and adults often complain of muffled hearing or a sense of fullness in the ear. Younger children may turn up the television volume because of hearing loss. Exams and Tests  The doctor or nurse may find OME while checking your child's ears after an ear infection has been treated. The doctor or nurse will look for certain changes when examining the eardrum:  Air bubbles on the surface of the eardrum   Dullness of the eardrum when a light is used   Eardrum that does not seem to move when little puffs of air are blown at it   Fluid behind the eardrum  A test called tympanometry is a more accurate tool for diagnosing OME. The results of this test can help tell the amount and thickness of the fluid. An acoustic otoscope or reflectometer is a more portable device that accurately detects the presence of fluid in the middle ear. An audiometer or some other type of formal hearing test may help the health care provider decide what treatment is needed. Treatment  Unless there are also signs of an infection, most health care providers will not treat OME at first. Instead, they will recheck the problem in 2 - 3 months. Some children who have had repeat ear infections may receive a smaller, daily dose of antibiotics to prevent new infections.   Certain changes may help clear up the fluid behind the eardrum: Avoiding cigarette smoke   Encouraging breastfeeding for infants   Treating allergies by staying away from triggers (such as dust). Older children may be given allergy medications. Most often the fluid will clear on its own. You doctor may suggest waiting and watching to see if the condition worsens. If the fluid is still present after 6 weeks, treatment might include:  Further observation   A hearing test   A single trial of antibiotics (if not given earlier)  If the fluid is still present at 8 - 12 weeks, antibiotics may be tried, although they are not always helpful. At some point, the child's hearing should be tested. If there is significant hearing loss (> 20 decibels), antibiotics or ear tubes might be appropriate. If the fluid is still present after 4 - 6 months, tubes are probably needed, even if there is no significant hearing loss. Sometimes the adenoids must be removed to restore proper functioning of the Eustachian tube. Porum (Prognosis)  Otitis media with effusion usually goes away on its own over a few weeks or months. Treatment may speed up this process. Glue ear may not clear as quickly as OME with a thinner effusion. OME is usually not life threatening. Most children do not have long-term damage to their hearing or speaking ability, even when the fluid remains for many months. Alternative Names  OME; Secretory otitis media;  Serous otitis media; Silent otitis media; Silent ear infection; Glue ear

## 2021-03-08 ENCOUNTER — TELEPHONE (OUTPATIENT)
Dept: FAMILY MEDICINE CLINIC | Age: 36
End: 2021-03-08

## 2021-03-08 DIAGNOSIS — K50.90 CROHN'S DISEASE WITHOUT COMPLICATION, UNSPECIFIED GASTROINTESTINAL TRACT LOCATION (HCC): Primary | ICD-10-CM

## 2021-03-08 NOTE — TELEPHONE ENCOUNTER
Pt called in wants a referral for a crohn's disease Dr Jennings stated he is still having issues and flare up's  Pt stated he has discussed with Dr Shanell Whitfield   Please advise

## 2021-03-10 ENCOUNTER — TELEPHONE (OUTPATIENT)
Dept: SLEEP MEDICINE | Age: 36
End: 2021-03-10

## 2021-03-10 NOTE — TELEPHONE ENCOUNTER
----- Message from Merline Acron sent at 3/10/2021  8:24 AM EST -----  Regarding: Denial  Leifchristian Penny has been denied for in lab sleep study, no co-morbid conditions or contraindications to APAP, or failed APAP attempt. A peer to peer can be completed by calling [phone] or patient can switch to APAP.

## 2021-03-10 NOTE — PROGRESS NOTES
Kierra John         : 1985  [] [address]     [] [facility]      [] [facility]     [][facility]    [] [facility]  [] [facility]   [] Other:  Diagnosis: [x] ERIC (G47.33) [] CSA (G47.31) [] Apnea (G47.30)   Length of Need: [] 12 Months [x] 99 Months [] Other:    Machine (PEGGY!): [x] Respironics Dream Station      Auto [x] ResMed AirSense     Auto [] Other:     [x]  CPAP () [] Bilevel ()   Mode: [x] Auto [] Spontaneous    Mode: [] Auto [] Spontaneous           Between 5 and 15 cm                 Comfort Settings:   - Ramp Pressure: 5 cmH2O                                        - Ramp time: 15 min                                     -  Flex/EPR - 3 full time                                    - For ResMed Bilevel (TiMax-4 sec   TiMin- 0.2 sec)     Humidifier: [x] Heated ()        [x] Water chamber replacement ()/ 1 per 6 months        Mask:  Please always start with the mask the patient used during the titraion   [x] Nasal () /1 per 3 months [x] Full Face () /1 per 3 months   [x] Patient choice -Size and fit mask [x] Patient Choice - Size and fit mask   [] Dispense:  [] Dispense:    [x] Headgear () / 1 per 3 months [x] Headgear () / 1 per 3 months   [x] Replacement Nasal Cushion ()/2 per month [x] Interface Replacement ()/1 per month   [x] Replacement Nasal Pillows ()/2 per month         Tubing: [x] Heated ()/1 per 3 months    [] Standard ()/1 per 3 months [] Other:           Filters: [x] Non-disposable ()/1 per 6 months     [x] Ultra-Fine, Disposable ()/2 per month        Miscellaneous: [x] Chin Strap ()/ 1 per 6 months [] O2 bleed-in:       LPM   [] Oximetry on CPAP/Bilevel []  Other:          Start Order Date: 03/10/21    MEDICAL JUSTIFICATION:  I, the undersigned, certify that the above prescribed supplies are medically necessary for this patients wellbeing.   In my opinion, the supplies are both reasonable and necessary in reference to accepted standards of medicalpractice in treatment of this patients condition.     Katya Ellison MD      NPI: [id number]       Order Signed Date: 03/10/21    Electronically signed by Katya Ellison MD on 3/10/2021 at 8:28 AM

## 2021-03-19 ENCOUNTER — TELEPHONE (OUTPATIENT)
Dept: FAMILY MEDICINE CLINIC | Age: 36
End: 2021-03-19
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
## 2021-03-19 DIAGNOSIS — S50.861A INSECT BITE OF RIGHT FOREARM, INITIAL ENCOUNTER: ICD-10-CM

## 2021-03-19 RX ORDER — PREDNISONE 20 MG/1
20 TABLET ORAL DAILY
Qty: 5 TABLET | Refills: 0 | Status: SHIPPED | OUTPATIENT
Start: 2021-03-19 | End: 2021-03-20 | Stop reason: SDUPTHER

## 2021-03-20 RX ORDER — PREDNISONE 20 MG/1
20 TABLET ORAL DAILY
Qty: 5 TABLET | Refills: 0 | Status: SHIPPED | OUTPATIENT
Start: 2021-03-20 | End: 2021-11-30 | Stop reason: SDUPTHER

## 2021-05-11 ENCOUNTER — TELEPHONE (OUTPATIENT)
Dept: FAMILY MEDICINE CLINIC | Age: 36
End: 2021-05-11

## 2021-05-11 NOTE — TELEPHONE ENCOUNTER
Pt called in wants a appointment he has a rash on his arm he got a tattoo there last week took the wrap off he is using hydrocortisone cream and its not helping to much pt has no other symptoms     FYI pt is coming in today

## 2021-11-11 ENCOUNTER — TELEPHONE (OUTPATIENT)
Dept: FAMILY MEDICINE CLINIC | Age: 36
End: 2021-11-11

## 2021-11-11 NOTE — TELEPHONE ENCOUNTER
----- Message from Shelley Middleton sent at 11/11/2021 12:24 PM EST -----  Subject: Message to Provider    QUESTIONS  Information for Provider? Pt would like to know if he ever had a TB test   done and if so when did he have it done. Pt also needs a copy of his shot   records. Please contact pt regarding these questions.  ---------------------------------------------------------------------------  --------------  CALL BACK INFO  What is the best way for the office to contact you? OK to leave message on   voicemail  Preferred Call Back Phone Number? [phone]  ---------------------------------------------------------------------------  --------------  SCRIPT ANSWERS  Relationship to Patient?  Self

## 2021-11-11 NOTE — TELEPHONE ENCOUNTER
Call pt and let him know we do not have record of a tb test and we can print his immunization record and he can pick it up at the office.

## 2021-11-26 ENCOUNTER — NURSE TRIAGE (OUTPATIENT)
Dept: OTHER | Facility: CLINIC | Age: 36
End: 2021-11-26

## 2021-11-26 NOTE — TELEPHONE ENCOUNTER
[facility]. Brief description of triage: severe pain in back feels tennis ball sized swelling in left lower back, pain down right leg. Bottom of right foot numb      Triage indicates for patient to pcp today, office is closed referred to [facility] or ER    Care advice provided, patient verbalizes understanding; denies any other questions or concerns; instructed to call back for any new or worsening symptoms. Attention Provider: Thank you for allowing me to participate in the care of your patient. The patient was connected to triage in response to information provided to the [facility]/PSC. Please do not respond through this encounter as the response is not directed to a shared pool. Reason for Disposition   SEVERE back pain (e.g., excruciating, unable to do any normal activities) and not improved after pain medicine and CARE ADVICE    Answer Assessment - Initial Assessment Questions  1. ONSET: \"When did the pain begin? \"       5 days ago    2. LOCATION: \"Where does it hurt? \" (upper, mid or lower back)      Low back, pain radiating down right leg, has swelling about tennis ball sized on left lower back. Bottom of right foot numb      3. SEVERITY: \"How bad is the pain? \"  (e.g., Scale 1-10; mild, moderate, or severe)    - MILD (1-3): doesn't interfere with normal activities     - MODERATE (4-7): interferes with normal activities or awakens from sleep     - SEVERE (8-10): excruciating pain, unable to do any normal activities       Severe, rates pain 12    4. PATTERN: \"Is the pain constant? \" (e.g., yes, no; constant, intermittent)       Constant, but can get some relief with certain positions    5. RADIATION: \"Does the pain shoot into your legs or elsewhere? \"      Yes, down right leg    6. CAUSE:  \"What do you think is causing the back pain? \"       Slept on a sofa at mothers house    7.  BACK OVERUSE:  Larance Right recent lifting of heavy objects, strenuous work or exercise? \"      Denies    8. MEDICATIONS: \"What have you taken so far for the pain? \" (e.g., nothing, acetaminophen, NSAIDS)      Had musc relaxers and hydrocodone left from previous injuries took last pm.  Also taking tylenol    9. NEUROLOGIC SYMPTOMS: \"Do you have any weakness, numbness, or problems with bowel/bladder control? \"      Weakness in right leg and numbness in right foot    10. OTHER SYMPTOMS: \"Do you have any other symptoms? \" (e.g., fever, abdominal pain, burning with urination, blood in urine)        Denies    11. PREGNANCY: \"Is there any chance you are pregnant? \" (e.g., yes, no; LMP)        n/a    Protocols used: BACK PAIN-ADULT-[place]

## 2021-11-30 ENCOUNTER — TELEPHONE (OUTPATIENT)
Dept: FAMILY MEDICINE CLINIC | Age: 36
End: 2021-11-30

## 2021-11-30 ENCOUNTER — OFFICE VISIT (OUTPATIENT)
Dept: FAMILY MEDICINE CLINIC | Age: 36
End: 2021-11-30
Payer: COMMERCIAL

## 2021-11-30 VITALS
HEIGHT: 67 IN | WEIGHT: 244 LBS | BODY MASS INDEX: 38.3 KG/M2 | DIASTOLIC BLOOD PRESSURE: 78 MMHG | RESPIRATION RATE: 16 BRPM | SYSTOLIC BLOOD PRESSURE: 114 MMHG | OXYGEN SATURATION: 98 % | HEART RATE: 78 BPM

## 2021-11-30 DIAGNOSIS — M53.3 SACROILIAC PAIN: ICD-10-CM

## 2021-11-30 DIAGNOSIS — M54.31 SCIATICA OF RIGHT SIDE: ICD-10-CM

## 2021-11-30 DIAGNOSIS — K50.90 CROHN'S DISEASE WITHOUT COMPLICATION, UNSPECIFIED GASTROINTESTINAL TRACT LOCATION (HCC): ICD-10-CM

## 2021-11-30 DIAGNOSIS — M54.31 SCIATICA OF RIGHT SIDE: Primary | ICD-10-CM

## 2021-11-30 PROCEDURE — G8417 CALC BMI ABV UP PARAM F/U: HCPCS | Performed by: FAMILY MEDICINE

## 2021-11-30 PROCEDURE — G8484 FLU IMMUNIZE NO ADMIN: HCPCS | Performed by: FAMILY MEDICINE

## 2021-11-30 PROCEDURE — 99213 OFFICE O/P EST LOW 20 MIN: CPT | Performed by: FAMILY MEDICINE

## 2021-11-30 PROCEDURE — 1036F TOBACCO NON-USER: CPT | Performed by: FAMILY MEDICINE

## 2021-11-30 PROCEDURE — G8427 DOCREV CUR MEDS BY ELIG CLIN: HCPCS | Performed by: FAMILY MEDICINE

## 2021-11-30 RX ORDER — CYCLOBENZAPRINE HCL 10 MG
10 TABLET ORAL 3 TIMES DAILY PRN
Qty: 30 TABLET | Refills: 0 | Status: SHIPPED | OUTPATIENT
Start: 2021-11-30 | End: 2021-12-10

## 2021-11-30 RX ORDER — PREDNISONE 20 MG/1
20 TABLET ORAL 2 TIMES DAILY
Qty: 10 TABLET | Refills: 0 | Status: SHIPPED | OUTPATIENT
Start: 2021-11-30 | End: 2021-12-05

## 2021-11-30 RX ORDER — PREDNISONE 20 MG/1
20 TABLET ORAL DAILY
Qty: 10 TABLET | Refills: 0 | Status: SHIPPED | OUTPATIENT
Start: 2021-11-30 | End: 2021-11-30 | Stop reason: SDUPTHER

## 2021-11-30 RX ORDER — INFLIXIMAB 100 MG/10ML
5 INJECTION, POWDER, LYOPHILIZED, FOR SOLUTION INTRAVENOUS SEE ADMIN INSTRUCTIONS
Qty: 1 EACH | Refills: 4 | COMMUNITY
Start: 2021-11-30

## 2021-11-30 NOTE — PATIENT INSTRUCTIONS
INSTRUCTIONS  · NEXT APPOINTMENT: 3 weeks  · Take prednisone fore 5 days. · Get started with PT.  · Use heat 20 minutes on painful joint/muscle. Then do stretches. May ice any sore spots or for swelling afterwards. · Can continue muscle relaxer  Patient Education               Piriformis Syndrome Rehabilitation Exercises     You may do all of these exercises right away. Piriformis stretch: Lying on your back with both knees bent, rest the ankle of your injured leg over the knee of your uninjured leg. Grasp the thigh of your uninjured leg and pull that knee toward your chest. You will feel a stretch along the buttocks and possibly along the outside of your hip on the injured side. Hold this for 15 to 30 seconds. Repeat 3 times. Standing hamstring stretch: Place the heel of your leg on a stool about 15 inches high. Keep your knee straight. Lean forward, bending at the hips until you feel a mild stretch in the back of your thigh. Make sure you do not roll your shoulders and bend at the waist when doing this or you will stretch your lower back instead. Hold the stretch for 15 to 30 seconds. Repeat 3 times. Pelvic tilt: Lie on your back with your knees bent and your feet flat on the floor. Tighten your abdominal muscles and push your lower back into the floor. Hold this position for 5 seconds, then relax. Do 3 sets of 10. Partial curl: Lie on your back with your knees bent and your feet flat on the floor. Tighten your stomach muscles and flatten your back against the floor. Tuck your chin to your chest. With your hands stretched out in front of you, curl your upper body forward until your shoulders clear the floor. Hold this position for 3 seconds. Don't hold your breath. It helps to breathe out as you lift your shoulders up. Relax. Repeat 10 times. Build to 3 sets of 10. To challenge yourself, clasp your hands behind your head and keep your elbows out to the side.    Prone hip extension: Lie on your stomach with your legs straight out behind you. Tighten up your buttocks muscles and lift one leg off the floor about 8 inches. Keep your knee straight. Hold for 5 seconds. Then lower your leg and relax. Do 3 sets of 10. Repeat this exercise for the other leg.
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
## 2021-11-30 NOTE — TELEPHONE ENCOUNTER
[facility] calling stating that pt is on prednisone 20 mg tablet and instructions do not match the quantity for rx. Stated that they would need it sent with a different quantity or instructions to match.     Pharmacy can be reached at [phone] option 0

## 2021-12-07 ENCOUNTER — OFFICE VISIT (OUTPATIENT)
Dept: ORTHOPEDIC SURGERY | Age: 36
End: 2021-12-07
Payer: COMMERCIAL

## 2021-12-07 VITALS — BODY MASS INDEX: 38.3 KG/M2 | WEIGHT: 244 LBS | HEIGHT: 67 IN

## 2021-12-07 DIAGNOSIS — M54.50 LUMBAR PAIN: ICD-10-CM

## 2021-12-07 DIAGNOSIS — M51.16 LUMBAR DISC HERNIATION WITH RADICULOPATHY: Primary | ICD-10-CM

## 2021-12-07 PROCEDURE — 1036F TOBACCO NON-USER: CPT | Performed by: ORTHOPAEDIC SURGERY

## 2021-12-07 PROCEDURE — G8484 FLU IMMUNIZE NO ADMIN: HCPCS | Performed by: ORTHOPAEDIC SURGERY

## 2021-12-07 PROCEDURE — G8427 DOCREV CUR MEDS BY ELIG CLIN: HCPCS | Performed by: ORTHOPAEDIC SURGERY

## 2021-12-07 PROCEDURE — 99204 OFFICE O/P NEW MOD 45 MIN: CPT | Performed by: ORTHOPAEDIC SURGERY

## 2021-12-07 PROCEDURE — G8417 CALC BMI ABV UP PARAM F/U: HCPCS | Performed by: ORTHOPAEDIC SURGERY

## 2021-12-07 RX ORDER — GABAPENTIN 300 MG/1
300 CAPSULE ORAL 4 TIMES DAILY
Qty: 60 CAPSULE | Refills: 0 | Status: SHIPPED | OUTPATIENT
Start: 2021-12-07 | End: 2022-09-26 | Stop reason: ALTCHOICE

## 2021-12-07 NOTE — PLAN OF CARE
[facility] [place]  Phone: [phone]   Fax:     [phone]                                                       Physical Therapy Certification    Dear Referring Practitioner: Kya Connelly MD,    We had the pleasure of evaluating the following patient for physical therapy services at [facility]. A summary of our findings can be found in the initial assessment below. This includes our plan of care. If you have any questions or concerns regarding these findings, please do not hesitate to contact me at the office phone number checked above. Thank you for the referral.       Physician Signature:_______________________________Date:__________________  By signing above (or electronic signature), therapists plan is approved by physician              Patient: Augustin Bradley   : 1985   MRN: [id number]  Referring Physician: Referring Practitioner: Kya Connelly MD      Evaluation Date: 2021      Medical Diagnosis Information:  Diagnosis: M54.31 (ICD-10-CM) - Sciatica of right side, M53.3 (ICD-10-CM) - Sacroiliac pain                                             Insurance information: PT Insurance Information: [address]     Precautions/ Contra-indications: None   Latex Allergy:  [x]NO      [employer]  Preferred Language for Healthcare:   [x]English       []other:    C-SSRS Triggered by Intake questionnaire (Past 2 wk assessment ):   [x] No, Questionnaire did not trigger screening.   [] Yes, Patient intake triggered C-SSRS Screening      [] C-SSRS Screening completed  [] PCP notified via Epic     SUBJECTIVE: Patient reports, \"My sciatic nerve is ripping me a new one. \"  He states the pain began 3 weeks. Pain located posteriorly down the R LE and centrally in the low back.  Additionally,  he notices some R calf weakness and N/T in his R heel and Co-morbidities/Complexities (which will affect course of rehabilitation):   [x]None           Arthritic conditions   []Rheumatoid arthritis (M05.9)  []Osteoarthritis (M19.91)   Cardiovascular conditions   []Hypertension (I10)  []Hyperlipidemia (E78.5)  []Angina pectoris (I20)  []Atherosclerosis (I70)  []CVA Musculoskeletal conditions   []Disc pathology   []Congenital spine pathologies   []Prior surgical intervention  []Osteoporosis (M81.8)  []Osteopenia (M85.8)   Endocrine conditions   []Hypothyroid (E03.9)  []Hyperthyroid Gastrointestinal conditions   []Constipation (S06.74)   Metabolic conditions   []Morbid obesity (E66.01)  []Diabetes type 1(E10.65) or 2 (E11.65)   []Neuropathy (G60.9)     Pulmonary conditions   []Asthma (J45)  []Coughing   []COPD (J44.9)   Psychological Disorders  []Anxiety (F41.9)  []Depression (F32.9)   []Other:   []Other:           Barriers to/and or personal factors that will affect rehab potential:              []Age  []Sex    []Smoker              []Motivation/Lack of Motivation                        []Co-Morbidities              []Cognitive Function, education/learning barriers              []Environmental, home barriers              [x]profession/work barriers  []past PT/medical experience  []other:  Justification:     Falls Risk Assessment (30 days):   [x] Falls Risk assessed and no intervention required. [] Falls Risk assessed and Patient requires intervention due to being higher risk   TUG score (>12s at risk):     [] Falls education provided, including:         ASSESSMENT: Pt is a 40 yo male who presents to PT with LBP. Signs and symptoms consistent with radiating pain into the R LE possibly due to disc involvement (L5 - S1). Pt demonstrates good tolerance to PT initial evaluation. Upon IE, pt demonstrates decreased/painful ROM, decreased flexibility, + structure specific testing, strength deficits, diminished sensation, and gait deficits.  Pt would benefit from PT 2x/week for 4-6 weeks in order to address the impairments listed. Functional Impairments:     []Noted lumbar/proximal hip hypomobility   []Noted lumbosacral and/or generalized hypermobility   [x]Decreased Lumbosacral/hip/LE functional ROM   [x]Decreased core/proximal hip strength and neuromuscular control    [x]Decreased LE functional strength    [x]Abnormal reflexes/sensation/myotomal/dermatomal deficits  []Reduced balance/proprioceptive control    []other:      Functional Activity Limitations (from functional questionnaire and intake)   [x]Reduced ability to tolerate prolonged functional positions   []Reduced ability or difficulty with changes of positions or transfers between positions   [x]Reduced ability to maintain good posture and demonstrate good body mechanics with sitting, bending, and lifting   []Reduced ability to sleep   [x] Reduced ability or tolerance with driving and/or computer work   [x]Reduced ability to perform lifting, reaching, carrying tasks   [x]Reduced ability to squat   [x]Reduced ability to forward bend   [x]Reduced ability to ambulate prolonged functional periods/distances/surfaces   [x]Reduced ability to ascend/descend stairs   []other:       Participation Restrictions   []Reduced participation in self care activities   []Reduced participation in home management activities   [x]Reduced participation in work activities   [x]Reduced participation in social activities. [x]Reduced participation in sport/recreational activities. Classification:   []Signs/symptoms consistent with Lumbar instability/stabilization subgroup. []Signs/symptoms consistent with Lumbar mobilization/manipulation subgroup, myotomes and dermatomes intact. Meets manipulation criteria.     [x]Signs/symptoms consistent with Lumbar direction specific/centralization subgroup   [x]Signs/symptoms consistent with Lumbar traction subgroup   []Signs/symptoms consistent with lumbar facet dysfunction   []Signs/symptoms consistent with lumbar stenosis type dysfunction   [x]Signs/symptoms consistent with nerve root involvement including myotome & dermatome dysfunction   []Signs/symptoms consistent with post-surgical status including: decreased ROM, strength and function. []signs/symptoms consistent with pathology which may benefit from Dry needling     []other:      Prognosis/Rehab Potential:      []Excellent   [x]Good    []Fair   []Poor    Tolerance of evaluation/treatment:    []Excellent   [x]Good    []Fair   []Poor     Physical Therapy Evaluation Complexity Justification  [x] A history of present problem with:  [x] no personal factors and/or comorbidities that impact the plan of care;  []1-2 personal factors and/or comorbidities that impact the plan of care  []3 personal factors and/or comorbidities that impact the plan of care  [x] An examination of body systems using standardized tests and measures addressing any of the following: body structures and functions (impairments), activity limitations, and/or participation restrictions;:  [x] a total of 1-2 or more elements   [] a total of 3 or more elements   [] a total of 4 or more elements   [x] A clinical presentation with:  [x] stable and/or uncomplicated characteristics   [] evolving clinical presentation with changing characteristics  [] unstable and unpredictable characteristics;   [x] Clinical decision making of [] low, [x] moderate, [] high complexity using standardized patient assessment instrument and/or measurable assessment of functional outcome.     [] EVAL (LOW) 69854 (typically 20 minutes face-to-face)  [x] EVAL (MOD) 78989 (typically 30 minutes face-to-face)  [] EVAL (HIGH) 08658 (typically 45 minutes face-to-face)  [] RE-EVAL     PLAN: Begin PT focusing on: proximal hip mobilizations, LB mobs, LB core activation, proximal hip activation, and HEP    Frequency/Duration:  2 days per week for 4-6 Weeks:  Interventions:  [x]  Therapeutic exercise including: strength training, ROM, for LE, Glutes and core   [x]  NMR activation and proprioception for glutes , LE and Core   [x]  Manual therapy as indicated for Hip complex, LE and spine to include: Dry Needling/IASTM, STM, PROM, Gr I-IV mobilizations, manipulation. [x]  Modalities as needed that may include: thermal agents, E-stim, Biofeedback, US, iontophoresis as indicated  [x]  Patient education on joint protection, postural re-education, activity modification, progression of HEP. HEP instruction:   Access Code: [email or web address]  URL: [employer]/  Date: 12/08/2021  Prepared by: Ania Magana  Exercises   Prone Press Up on Elbows - 1 x daily - 7 x weekly - 3 sets - 30 hold   Supine Lower Trunk Rotation - 1 x daily - 7 x weekly - 2 sets - 10 reps   Supine 90/90 Sciatic Nerve Glide with Knee Flexion/Extension - 1 x daily - 7 x weekly - 3 sets - 10 reps  Pt education on proper lifting body mechanics and pathology/anatomy of PT diagnosis. GOALS:  Patient stated goal: \"To be able to work pain free and walking normally. \"  [] Progressing: [] Met: [] Not Met: [] Adjusted  Therapist goals for Patient:   Short Term Goals: To be achieved in: 2 weeks  1. Independent in HEP and progression per patient tolerance, in order to prevent re-injury. [] Progressing: [] Met: [] Not Met: [] Adjusted  2. Patient will have a decrease in pain to facilitate improvement in movement, function, and ADLs as indicated by Functional Deficits. [] Progressing: [] Met: [] Not Met: [] Adjusted    Long Term Goals: To be achieved in: 6 weeks  1. Disability index score of 28 or less for the [place] to assist with reaching prior level of function. [] Progressing: [] Met: [] Not Met: [] Adjusted  2. Patient will demonstrate increased Lumbar Rot AROM to 75% to promote good LS mobility, good hip ROM to allow for proper joint functioning as indicated by patients Functional Deficits. [] Progressing: [] Met: [] Not Met: [] Adjusted  3.  Patient will demonstrate an increase in R LE/glute Strength to 5/5 for proper functional mobility as indicated by patients Functional Deficits. [] Progressing: [] Met: [] Not Met: [] Adjusted  4. Patient will return to walking a few blocks (300 - 400 m) without increased symptoms or restriction. [] Progressing: [] Met: [] Not Met: [] Adjusted  5. Patient will be able to tolerated sitting in a chair/driving for 30 min without increased pain. [] Progressing: [] Met: [] Not Met: [] Adjusted     Electronically signed by:  Matt Moeller PT        Note: If patient does not return for scheduled/recommended follow up visits, this note will serve as a discharge from care along with the most recent update on progress.

## 2021-12-07 NOTE — PROGRESS NOTES
New Patient: LUMBAR SPINE    Referring Provider:  Dr Arzate Press:    Chief Complaint   Patient presents with    Back Pain     lumbar       HISTORY OF PRESENT ILLNESS:    Mr. Sarah Locke  is a pleasant 39 y.o. referred by Dr. Nichelle Salazar for evaluation of low back and and right leg pain. He notes his symptoms began insidiously about 2 to 3 weeks ago. Right leg pain radiates in an S1 distribution. He rates the intensity of his back pain and leg pain 10/10. His symptoms have not changed since onset. He notes numbness tingling and weakness of his right leg. Current/Past Treatment:   · Physical Therapy: None  · Chiropractic: None  · Injection: None  · Medications: Flexeril and oral steroids    Past Medical History:   Past Medical History:   Diagnosis Date    [facility]      Past Surgical History:     History reviewed. No pertinent surgical history. Current Medications:     Current Outpatient Medications:     inFLIXimab (REMICADE) 100 MG injection, Infuse 60 mLs intravenously See Admin Instructions Per GI, Disp: 1 each, Rfl: 4    cyclobenzaprine (FLEXERIL) 10 MG tablet, Take 1 tablet by mouth 3 times daily as needed for Muscle spasms, Disp: 30 tablet, Rfl: 0  Allergies:  Patient has no known allergies. Social History:    reports that he has never smoked. He has never used smokeless tobacco. He reports that he does not drink alcohol and does not use drugs. Family History:   History reviewed. No pertinent family history.     REVIEW OF SYSTEMS: Full ROS noted & scanned   CONSTITUTIONAL: Denies unexplained weight loss, fevers, chills or fatigue  NEUROLOGICAL: Denies unsteady gait or progressive weakness  MUSCULOSKELETAL: Denies joint swelling or redness  PSYCHOLOGICAL: Denies anxiety, depression   SKIN: Denies skin changes, delayed healing, rash, itching   HEMATOLOGIC: Denies easy bleeding or bruising  ENDOCRINE: Denies excessive thirst, urination, heat/cold  RESPIRATORY: Denies current dyspnea, cough  GI: Denies nausea, vomiting, diarrhea   : Denies bowel or bladder issues      PHYSICAL EXAM:    Vitals: Height 5' 7.01\" (1.702 m), weight 244 lb (110.7 kg). GENERAL EXAM:  · General Apparence: Patient is adequately groomed with no evidence of malnutrition. · Orientation: The patient is oriented to time, place and person. · Mood & Affect:The patient's mood and affect are appropriate. · Vascular: Examination reveals no swelling tenderness in upper or lower extremities. Good capillary refill. · Lymphatic: The lymphatic examination bilaterally reveals all areas to be without enlargement or induration  · Sensation: Sensation is intact without deficit  · Coordination/Balance: Good coordination     LUMBAR/SACRAL EXAMINATION:  · Inspection: Local inspection shows no step-off or bruising. Lumbar alignment is normal.  Sagittal and Coronal balance is neutral.      · Palpation:   No evidence of tenderness at the midline. No tenderness bilaterally at the paraspinal or trochanters. There is no step-off or paraspinal spasm. · Range of Motion: Lumbar flexion, extension and rotation are mildly limited due to pain. · Strength:   Strength testing is 5/5 in all muscle groups tested. · Special Tests:   Positive right straight leg raise and crossed SLR negative. Leg length and pelvis level. · Skin: There are no rashes, ulcerations or lesions. · Reflexes: Absent right Achilles reflex otherwise reflexes are symmetrically 2+ at the patellar and ankle tendons. Clonus absent bilaterally at the feet. · Gait & station: normal, patient ambulates without assistance    · Additional Examinations:   · RIGHT LOWER EXTREMITY: Inspection/examination of the right lower extremity does not show any tenderness, deformity or injury. Range of motion is unremarkable. There is no gross instability. There are no rashes, ulcerations or lesions.  Strength and tone are normal.    · LEFT LOWER EXTREMITY:  Inspection/examination of the left lower extremity does not show any tenderness, deformity or injury. Range of motion is unremarkable. There is no gross instability. There are no rashes, ulcerations or lesions. Strength and tone are normal.    Diagnostic Testing:    I reviewed AP and lateral x-rays of his lumbar spine were obtained in the office today. Those show mild lumbar degenerative changes    Impression:   Lumbar disc herniation with radiculopathy    Plan:    Discussed treatment options including observation, physical therapy, epidural injection, and additional imaging. He would like to proceed with gabapentin and physical therapy.   He will call to schedule lumbar MRI if his symptoms persist after those

## 2021-12-08 ENCOUNTER — HOSPITAL ENCOUNTER (OUTPATIENT)
Dept: PHYSICAL THERAPY | Age: 36
Setting detail: THERAPIES SERIES
Discharge: HOME | End: 2021-12-08
Payer: COMMERCIAL

## 2021-12-08 PROCEDURE — 97530 THERAPEUTIC ACTIVITIES: CPT

## 2021-12-08 PROCEDURE — 97162 PT EVAL MOD COMPLEX 30 MIN: CPT

## 2021-12-08 PROCEDURE — 97110 THERAPEUTIC EXERCISES: CPT

## 2021-12-08 ASSESSMENT — PAIN SCALES - QUEBEC BACK PAIN DISABILITY SCALE
MOVE A CHAIR: 0
CARRY TWO BAGS OF GROCERIES: 0
CLIMB ONE FLIGHT OF STAIRS: 3
GET OUT OF BED: 0
RIDE IN A CAR: 4
QUEBEC DISABILITY INDEX: 40-59%
SLEEP THROUGH THE NIGHT: 0
BEND OVER TO CLEAN THE BATHTUB: 4
THROW A BALL: 0
REACH UP TO HIGH SHELVES: 2
LIFT AND CARRY A HEAVY SUITCASE: 4
TOTAL SCORE: 43
PULL OR PUSH HEAVY DOORS: 0
WALK SEVERAL KILOMETERS  OR MILES: 5
SIT IN A CHAIR FOR SEVERAL HOURS: 4
STAND UP FOR 20 TO 30 MINUTES: 4
TURN OVER IN BED: 0
TAKE FOOD OUT OF THE REFRIGERATOR: 0
QUEBEC CMS MODIFIER: CK
MAKE YOUR BED: 2
PUT ON SOCKS OR PANYHOSE: 2
WALK A FEW BLOCKS OR 300 TO 400M: 4
RUN ONE BLOCK OR 100M: 5

## 2021-12-08 NOTE — FLOWSHEET NOTE
[address] [facility] [address]  Phone: [phone]   Fax:     [phone]    Physical Therapy Treatment Note/ Progress Report:     Date:  2021    Patient Name:  Mika Galicia    :  1985  MRN: [id number]    Pertinent Medical History: Additional Pertinent Hx: Crohn Disease    Medical/Treatment Diagnosis Information:  · Diagnosis: M54.31 (ICD-10-CM) - Sciatica of right side, M53.3 (ICD-10-CM) - Sacroiliac pain  ·      Insurance/Certification information:  PT Insurance Information: [employer]  Physician Information:  Referring Practitioner: Fabienne Phipps MD  Plan of care signed (Y/N):     Date of Patient follow up with Physician:      Progress Report: []  Yes  [x]  No     Date Range for reporting period:  Beginnin2021  Ending:    Progress report due (10 Rx/or 30 days whichever is less):     Recertification due (POC duration/ or 90 days whichever is less):    Visit # POC/ Insurance Allowable Auth Needed   1 bomn []Yes    [x]No     Functional Scale:       Date Assessed: at eval (21)  Test: [place]  Score: 38    Pain level:  6/10     History of Injury: Patient reports, \"My sciatic nerve is ripping me a new one. \"  He states the pain began 3 weeks. Pain located posteriorly down the R LE and centrally in the low back. Additionally,  he notices some R calf weakness and N/T in his R heel and lateral aspect of the foot. He contributes the pain to sleeping on a couch and woke up with the pain. He noticed prior to that he felt tight on R side with stretching and noticed a buldge/swelling in his low back. Pt had X-rays and he was told he has a herniated disc. He states he was prescribed a nerve medication and prednisone which has been helpful. He states he has been walking with a limp. Pt is active at his job and goes to the gym (runs, stretches, lifting). SUBJECTIVE:        OBJECTIVE:    Observation:    Test measurements:      RESTRICTIONS/PRECAUTIONS: pt exhibits signs and symptoms consistent with possible L5-S1 disc involvement. Exercises/Interventions:     Therapeutic Ex (25818)   Min: 10' Resistance/Reps Notes/Cues   LTR  Sciatic N. Glide x20 total  2x10   90/90 hip/knee position                                           Manual Intervention (41122) Min:               NMR re-education (55690)   Min:     Mf Activation- re-ed     TrA Re-ed activation     Glute Max re-ed activation          Therapeutic Activity (04956) Min: 15'     Prone lying  Prone on Elbows 3-5 min  3x30 sec    Pt education Proper LE/lifting body mechanics and functional anatomy/pathophysiology. Modalities  Min:             Other Therapeutic Activities:  Pt was educated on PT POC, Diagnosis, Prognosis, pathomechanics as well as frequency and duration of scheduling future physical therapy appointments. Time was also taken on this day to answer all patient questions and participation in PT. Reviewed appointment policy in detail with patient and patient verbalized understanding. Home Exercise Program:   Access Code: [email or web address]  URL: [email or web address]  Date: 12/08/2021  Prepared by: Cristina Mukherjee  Exercises              Prone Press Up on Elbows - 1 x daily - 7 x weekly - 3 sets - 30 hold              Supine Lower Trunk Rotation - 1 x daily - 7 x weekly - 2 sets - 10 reps              Supine 90/90 Sciatic Nerve Glide with Knee Flexion/Extension - 1 x daily - 7 x weekly - 3 sets - 10 reps  Pt education on proper lifting body mechanics and pathology/anatomy of PT diagnosis.      Therapeutic Exercise and NMR EXR  [x] (92336) Provided verbal/tactile cueing for activities related to strengthening, flexibility, endurance, ROM  for improvements in proximal hip and core control with self care, mobility, lifting and ambulation.  [] (69423) Provided verbal/tactile cueing for activities related to improving balance, coordination, kinesthetic sense, posture, motor skill, proprioception  to assist with core control in self care, mobility, lifting, and ambulation. Therapeutic Activities:    [x] (45701 or 54505) Provided verbal/tactile cueing for activities related to improving balance, coordination, kinesthetic sense, posture, motor skill, proprioception and motor activation to allow for proper function  with self care and ADLs  [] (29942) Provided training and instruction to the patient for proper core and proximal hip recruitment and positioning with ambulation re-education     Home Exercise Program:    [x] (63933) Reviewed/Progressed HEP activities related to strengthening, flexibility, endurance, ROM of core, proximal hip and LE for functional self-care, mobility, lifting and ambulation   [] (13653) Reviewed/Progressed HEP activities related to improving balance, coordination, kinesthetic sense, posture, motor skill, proprioception of core, proximal hip and LE for self care, mobility, lifting, and ambulation      Manual Treatments:  PROM / STM / Oscillations-Mobs:  G-I, II, III, IV (PA's, Inf., Post.)  [] (74918) Provided manual therapy to mobilize proximal hip and LS spine soft tissue/joints for the purpose of modulating pain, promoting relaxation,  increasing ROM, reducing/eliminating soft tissue swelling/inflammation/restriction, improving soft tissue extensibility and allowing for proper ROM for normal function with self care, mobility, lifting and ambulation.        Charges:  Timed Code Treatment Minutes: 25   Total Treatment Minutes: 50     [] EVAL (LOW) 22669 (typically 20 minutes face-to-face)  [x] EVAL (MOD) 73161 (typically 30 minutes face-to-face)  [] EVAL (HIGH) 36111 (typically 45 minutes face-to-face)  [] RE-EVAL     [x] KF(92793) x  1   [] Dry needle 1 or 2 Muscles (19216)  [] NMR (80738) x     [] Dry needle 3+ Muscles (50488)  [] Manual (75682) x     [] Ultrasound (64498) x  [x] TA (64041) x 1    [] [facility] Traction (13831)  [] ES(attended) (34302)     [] ES (un) (25791):   [] Vasopump (84392) [] Ionto (08627)   [] Other:    GOALS:  Patient stated goal: \"To be able to work pain free and walking normally. \"  []? Progressing: []? Met: []? Not Met: []? Adjusted  Therapist goals for Patient:   Short Term Goals: To be achieved in: 2 weeks  1. Independent in HEP and progression per patient tolerance, in order to prevent re-injury. []? Progressing: []? Met: []? Not Met: []? Adjusted  2. Patient will have a decrease in pain to facilitate improvement in movement, function, and ADLs as indicated by Functional Deficits. []? Progressing: []? Met: []? Not Met: []? Adjusted     Long Term Goals: To be achieved in: 6 weeks  1. Disability index score of 28 or less for the [place] to assist with reaching prior level of function. []? Progressing: []? Met: []? Not Met: []? Adjusted  2. Patient will demonstrate increased Lumbar Rot AROM to 75% to promote good LS mobility, good hip ROM to allow for proper joint functioning as indicated by patients Functional Deficits. []? Progressing: []? Met: []? Not Met: []? Adjusted  3. Patient will demonstrate an increase in R LE/glute Strength to 5/5 for proper functional mobility as indicated by patients Functional Deficits. []? Progressing: []? Met: []? Not Met: []? Adjusted  4. Patient will return to walking a few blocks (300 - 400 m) without increased symptoms or restriction. []? Progressing: []? Met: []? Not Met: []? Adjusted  5. Patient will be able to tolerated sitting in a chair/driving for 30 min without increased pain. []? Progressing: []? Met: []? Not Met: []?  Adjusted            ASSESSMENT:  See eval    Treatment/Activity Tolerance:  [x] Patient tolerated treatment well [] Patient limited by fatique  [] Patient limited by pain  [] Patient limited by other medical complications  [] Other:     Overall Progression Towards Functional goals/ Treatment

## 2021-12-15 ENCOUNTER — HOSPITAL ENCOUNTER (OUTPATIENT)
Dept: PHYSICAL THERAPY | Age: 36
Setting detail: THERAPIES SERIES
Discharge: HOME | End: 2021-12-15
Payer: COMMERCIAL

## 2021-12-15 PROCEDURE — 97110 THERAPEUTIC EXERCISES: CPT

## 2021-12-15 PROCEDURE — 97530 THERAPEUTIC ACTIVITIES: CPT

## 2021-12-15 PROCEDURE — 97140 MANUAL THERAPY 1/> REGIONS: CPT

## 2021-12-15 NOTE — FLOWSHEET NOTE
SUBJECTIVE:    12/15/21: Pt reports, \"I am tired of the pain and the numbness in my foot. \" He reports a couple days of relief/decreased pain. He reports compliance with his HEP and that we went to the [facility] and used the whirlpool tub, which all helped decrease his pain. He states he notices his back pain flare up during and after work. OBJECTIVE:    Observation:    Test measurements:      RESTRICTIONS/PRECAUTIONS: pt exhibits signs and symptoms consistent with possible L5-S1 disc involvement. Exercises/Interventions:     Therapeutic Ex (74913)   Min: 15' Resistance/Reps Notes/Cues   LTR  Sciatic N. Glide x20 total  2x10   90/90 hip/knee position - Perform NV   SKTC 3x20 sec  Add NV   Incline Calf Str 3x30 sec Good Jose Manuel. Heel Raises x10 Fair Jose Manuel. Quad Str 2x30 sec, B Prone   Open Book Str x6 each side After 3 reps, increased ROM with overpressure                  Manual Intervention (67760) Min: 15'     STM  L Lumbar region    Open book str X3, B sides with overpressure    NMR re-education (50782)   Min: 5'     Mf Activation- re-ed     TrA Re-ed activation     Glute Max re-ed activation     Wobble Board - AP, ML 2x1 min each direction    Therapeutic Activity (86957) Min: 10'     Bicycle 5 min, Lv 3    Prone lying  Prone on Elbows 3-5 min  3x30 sec    Pt education Proper LE/lifting body mechanics and functional anatomy/pathophysiology. Modalities  Min:             Other Therapeutic Activities:  Pt was educated on PT POC, Diagnosis, Prognosis, pathomechanics as well as frequency and duration of scheduling future physical therapy appointments. Time was also taken on this day to answer all patient questions and participation in PT. Reviewed appointment policy in detail with patient and patient verbalized understanding. Home Exercise Program:   Access Code: [email or web address]  URL: [email or web address]  Date: 12/08/2021  Prepared by: Con Oyster  Exercises              Prone Press Up on Elbows - 1 x daily - 7 x weekly - 3 sets - 30 hold              Supine Lower Trunk Rotation - 1 x daily - 7 x weekly - 2 sets - 10 reps              Supine 90/90 Sciatic Nerve Glide with Knee Flexion/Extension - 1 x daily - 7 x weekly - 3 sets - 10 reps  Pt education on proper lifting body mechanics and pathology/anatomy of PT diagnosis. Therapeutic Exercise and NMR EXR  [x] (69629) Provided verbal/tactile cueing for activities related to strengthening, flexibility, endurance, ROM  for improvements in proximal hip and core control with self care, mobility, lifting and ambulation.  [] (67032) Provided verbal/tactile cueing for activities related to improving balance, coordination, kinesthetic sense, posture, motor skill, proprioception  to assist with core control in self care, mobility, lifting, and ambulation.      Therapeutic Activities:    [x] (50204 or 29474) Provided verbal/tactile cueing for activities related to improving balance, coordination, kinesthetic sense, posture, motor skill, proprioception and motor activation to allow for proper function  with self care and ADLs  [] (15833) Provided training and instruction to the patient for proper core and proximal hip recruitment and positioning with ambulation re-education     Home Exercise Program:    [x] (35300) Reviewed/Progressed HEP activities related to strengthening, flexibility, endurance, ROM of core, proximal hip and LE for functional self-care, mobility, lifting and ambulation   [] (25452) Reviewed/Progressed HEP activities related to improving balance, coordination, kinesthetic sense, posture, motor skill, proprioception of core, proximal hip and LE for self care, mobility, lifting, and ambulation      Manual Treatments:  PROM / STM / Oscillations-Mobs:  G-I, II, III, IV (PA's, Inf., Post.)  [] (44238) Provided manual therapy to mobilize proximal hip and LS spine soft tissue/joints for the purpose of modulating pain, promoting relaxation,  increasing ROM, Progressing: []? Met: []? Not Met: []? Adjusted  4. Patient will return to walking a few blocks (300 - 400 m) without increased symptoms or restriction. []? Progressing: []? Met: []? Not Met: []? Adjusted  5. Patient will be able to tolerated sitting in a chair/driving for 30 min without increased pain. []? Progressing: []? Met: []? Not Met: []? Adjusted            ASSESSMENT:  Pt demonstrates myofascial restrictions/muscular tightness in L lumbar/glute region. Decreased flexibility and mobility noted throughout LE and lumbar spine. Pt displays good-fair tolerance to PT follow session. Improved tolerance to flexibility/mobility based exercises. Continue to progress therex as tolerated with addition of strengthening as tolerated. No adverse reaction to today's treatment. Treatment/Activity Tolerance:  [x] Patient tolerated treatment well [] Patient limited by fatique  [] Patient limited by pain  [] Patient limited by other medical complications  [] Other:     Overall Progression Towards Functional goals/ Treatment Progress Update:  [] Patient is progressing as expected towards functional goals listed. [] Progression is slowed due to complexities/Impairments listed. [] Progression has been slowed due to co-morbidities.   [x] Plan just implemented, too soon to assess goals progression <30days   [] Goals require adjustment due to lack of progress  [] Patient is not progressing as expected and requires additional follow up with physician  [] Other:    Prognosis for POC: [x] Good [] Fair  [] Poor    Patient requires continued skilled intervention: [x] Yes  [] No        PLAN: See eval  [] Continue per plan of care [] Alter current plan (see comments)  [x] Plan of care initiated [] Hold pending MD visit [] Discharge    Electronically signed by: Cristina Mukherjee PT    Note: If patient does not return for scheduled/recommended follow up visits, this note will serve as a discharge from care along with the most recent update on progress.

## 2021-12-17 ENCOUNTER — APPOINTMENT (OUTPATIENT)
Dept: PHYSICAL THERAPY | Age: 36
End: 2021-12-17
Payer: COMMERCIAL

## 2021-12-23 ENCOUNTER — APPOINTMENT (OUTPATIENT)
Dept: PHYSICAL THERAPY | Age: 36
End: 2021-12-23
Payer: COMMERCIAL

## 2021-12-29 ENCOUNTER — APPOINTMENT (OUTPATIENT)
Dept: PHYSICAL THERAPY | Age: 36
End: 2021-12-29
Payer: COMMERCIAL

## 2022-01-10 ENCOUNTER — OFFICE VISIT (OUTPATIENT)
Dept: ORTHOPEDIC SURGERY | Age: 37
End: 2022-01-10
Payer: COMMERCIAL

## 2022-01-10 VITALS — BODY MASS INDEX: 38.3 KG/M2 | HEIGHT: 67 IN | WEIGHT: 244 LBS

## 2022-01-10 DIAGNOSIS — M51.16 LUMBAR DISC HERNIATION WITH RADICULOPATHY: Primary | ICD-10-CM

## 2022-01-10 PROCEDURE — G8484 FLU IMMUNIZE NO ADMIN: HCPCS | Performed by: ORTHOPAEDIC SURGERY

## 2022-01-10 PROCEDURE — G8427 DOCREV CUR MEDS BY ELIG CLIN: HCPCS | Performed by: ORTHOPAEDIC SURGERY

## 2022-01-10 PROCEDURE — G8417 CALC BMI ABV UP PARAM F/U: HCPCS | Performed by: ORTHOPAEDIC SURGERY

## 2022-01-10 PROCEDURE — 1036F TOBACCO NON-USER: CPT | Performed by: ORTHOPAEDIC SURGERY

## 2022-01-10 PROCEDURE — 99213 OFFICE O/P EST LOW 20 MIN: CPT | Performed by: ORTHOPAEDIC SURGERY

## 2022-01-10 NOTE — PROGRESS NOTES
New Patient: LUMBAR SPINE    Referring Provider:  Dr Denys Duarte:    Chief Complaint   Patient presents with    Back Pain     lumbar       HISTORY OF PRESENT ILLNESS:    Mr. Li Flower reports substantial improvement of his right leg pain. However he continues to have numbness in his right leg. Lupillo Case He denies saddle anesthesia or bowel bladder abnormality. Current/Past Treatment:   · Physical Therapy: Yes  · Chiropractic: None  · Injection: None  · Medications: Flexeril, gabapentin and oral steroids X2    Past Medical History:   Past Medical History:   Diagnosis Date    Crohn disease ([facility] [address] 8/29/2019      Past Surgical History:     History reviewed. No pertinent surgical history. Current Medications:     Current Outpatient Medications:     gabapentin (NEURONTIN) 300 MG capsule, Take 1 capsule by mouth 4 times daily for 60 doses. Intended supply: 30 days, Disp: 60 capsule, Rfl: 0    inFLIXimab (REMICADE) 100 MG injection, Infuse 60 mLs intravenously See Admin Instructions Per GI, Disp: 1 each, Rfl: 4  Allergies:  Patient has no known allergies. Social History:    reports that he has never smoked. He has never used smokeless tobacco. He reports that he does not drink alcohol and does not use drugs. Family History:   History reviewed. No pertinent family history.     REVIEW OF SYSTEMS: Full ROS noted & scanned   CONSTITUTIONAL: Denies unexplained weight loss, fevers, chills or fatigue  NEUROLOGICAL: Denies unsteady gait or progressive weakness  MUSCULOSKELETAL: Denies joint swelling or redness  PSYCHOLOGICAL: Denies anxiety, depression   SKIN: Denies skin changes, delayed healing, rash, itching   HEMATOLOGIC: Denies easy bleeding or bruising  ENDOCRINE: Denies excessive thirst, urination, heat/cold  RESPIRATORY: Denies current dyspnea, cough  GI: Denies nausea, vomiting, diarrhea   : Denies bowel or bladder issues      PHYSICAL EXAM:    Vitals: Height 5' 7.01\" (1.702 m), weight 244 lb (110.7 kg). GENERAL EXAM:  · General Apparence: Patient is adequately groomed with no evidence of malnutrition. · Orientation: The patient is oriented to time, place and person. · Mood & Affect:The patient's mood and affect are appropriate. · Vascular: Examination reveals no swelling tenderness in upper or lower extremities. Good capillary refill. · Lymphatic: The lymphatic examination bilaterally reveals all areas to be without enlargement or induration  · Sensation: Sensation is intact without deficit  · Coordination/Balance: Good coordination     LUMBAR/SACRAL EXAMINATION:  · Inspection: Local inspection shows no step-off or bruising. Lumbar alignment is normal.  Sagittal and Coronal balance is neutral.      · Palpation:   No evidence of tenderness at the midline. No tenderness bilaterally at the paraspinal or trochanters. There is no step-off or paraspinal spasm. · Range of Motion: Lumbar flexion, extension and rotation are mildly limited due to pain. · Strength:   Strength testing is 5/5 in all muscle groups tested. · Special Tests:   Positive right straight leg raise and crossed SLR negative. Leg length and pelvis level. · Skin: There are no rashes, ulcerations or lesions. · Reflexes: Absent right Achilles reflex otherwise reflexes are symmetrically 2+ at the patellar and ankle tendons. Clonus absent bilaterally at the feet. · Gait & station: normal, patient ambulates without assistance    · Additional Examinations:   · RIGHT LOWER EXTREMITY: Inspection/examination of the right lower extremity does not show any tenderness, deformity or injury. Range of motion is unremarkable. There is no gross instability. There are no rashes, ulcerations or lesions. Strength and tone are normal.    · LEFT LOWER EXTREMITY:  Inspection/examination of the left lower extremity does not show any tenderness, deformity or injury. Range of motion is unremarkable. There is no gross instability.  There are no rashes, ulcerations or lesions. Strength and tone are normal.    Diagnostic Testing:    I reviewed AP and lateral x-rays of his lumbar spine were obtained his last office visit. Those show mild lumbar degenerative changes    Impression:   Lumbar disc herniation with radiculopathy    Plan:    Discussed treatment options including observation, physical therapy, epidural injection, and additional imaging. He would like to proceed with observation. He may call to schedule a lumbar MRI scan with thoughts of a possible epidural injection.

## 2022-06-16 ENCOUNTER — TELEPHONE (OUTPATIENT)
Dept: ORTHOPEDIC SURGERY | Age: 37
End: 2022-06-16

## 2022-06-16 NOTE — TELEPHONE ENCOUNTER
Prescription Refill     Medication Name:  gabapentin  Pharmacy: [facility] on [address]  Patient Contact Number:  [phone]

## 2022-08-10 ENCOUNTER — TELEPHONE (OUTPATIENT)
Dept: FAMILY MEDICINE CLINIC | Age: 37
End: 2022-08-10

## 2022-08-10 NOTE — TELEPHONE ENCOUNTER
----- Message from Queen Gopi sent at 8/10/2022  1:06 PM EDT -----  Subject: Message to Provider    QUESTIONS  Information for Provider? patient would like to have the name and number   of the doctor he was referred to for a vasectomy. please call back with   information  ---------------------------------------------------------------------------  --------------  Citlaly MONTANO  [phone]; OK to leave message on voicemail  ---------------------------------------------------------------------------  --------------  SCRIPT ANSWERS  Relationship to Patient?  Self

## 2022-08-11 NOTE — TELEPHONE ENCOUNTER
I called and gave him the number for Dr. Margoth Bearden or Dr. Tara Campos.  [phone]  Let us know if he needs referral

## 2022-09-26 ENCOUNTER — OFFICE VISIT (OUTPATIENT)
Dept: FAMILY MEDICINE CLINIC | Age: 37
End: 2022-09-26
Payer: COMMERCIAL

## 2022-09-26 VITALS
HEIGHT: 67 IN | HEART RATE: 88 BPM | OXYGEN SATURATION: 97 % | WEIGHT: 256 LBS | BODY MASS INDEX: 40.18 KG/M2 | SYSTOLIC BLOOD PRESSURE: 120 MMHG | DIASTOLIC BLOOD PRESSURE: 72 MMHG

## 2022-09-26 DIAGNOSIS — I83.812 VARICOSE VEINS OF LEFT LOWER EXTREMITY WITH PAIN: Primary | ICD-10-CM

## 2022-09-26 DIAGNOSIS — Z13.220 SCREENING CHOLESTEROL LEVEL: ICD-10-CM

## 2022-09-26 DIAGNOSIS — Z13.1 SCREENING FOR DIABETES MELLITUS: ICD-10-CM

## 2022-09-26 PROCEDURE — G8427 DOCREV CUR MEDS BY ELIG CLIN: HCPCS | Performed by: FAMILY MEDICINE

## 2022-09-26 PROCEDURE — 99213 OFFICE O/P EST LOW 20 MIN: CPT | Performed by: FAMILY MEDICINE

## 2022-09-26 PROCEDURE — G8417 CALC BMI ABV UP PARAM F/U: HCPCS | Performed by: FAMILY MEDICINE

## 2022-09-26 PROCEDURE — 1036F TOBACCO NON-USER: CPT | Performed by: FAMILY MEDICINE

## 2022-09-26 SDOH — ECONOMIC STABILITY: FOOD INSECURITY: WITHIN THE PAST 12 MONTHS, THE FOOD YOU BOUGHT JUST DIDN'T LAST AND YOU DIDN'T HAVE MONEY TO GET MORE.: NEVER TRUE

## 2022-09-26 SDOH — ECONOMIC STABILITY: FOOD INSECURITY: WITHIN THE PAST 12 MONTHS, YOU WORRIED THAT YOUR FOOD WOULD RUN OUT BEFORE YOU GOT MONEY TO BUY MORE.: NEVER TRUE

## 2022-09-26 ASSESSMENT — PATIENT HEALTH QUESTIONNAIRE - PHQ9
SUM OF ALL RESPONSES TO PHQ QUESTIONS 1-9: 0
SUM OF ALL RESPONSES TO PHQ QUESTIONS 1-9: 0
SUM OF ALL RESPONSES TO PHQ9 QUESTIONS 1 & 2: 0
2. FEELING DOWN, DEPRESSED OR HOPELESS: 0
1. LITTLE INTEREST OR PLEASURE IN DOING THINGS: 0
SUM OF ALL RESPONSES TO PHQ QUESTIONS 1-9: 0
SUM OF ALL RESPONSES TO PHQ QUESTIONS 1-9: 0

## 2022-09-26 ASSESSMENT — SOCIAL DETERMINANTS OF HEALTH (SDOH): HOW HARD IS IT FOR YOU TO PAY FOR THE VERY BASICS LIKE FOOD, HOUSING, MEDICAL CARE, AND HEATING?: NOT HARD AT ALL

## 2022-09-26 NOTE — PATIENT INSTRUCTIONS
appearance. [place]  Varicose veins usually don't cause medical problems. If they do, your doctor may simply suggest making lifestyle changes. Sometimes varicose veins cause pain, blood clots, skin ulcers, or other problems. If this happens, your doctor may recommend one or more medical procedures. Some people choose to have these procedures to improve the way their veins look or to relieve pain. Many treatments for varicose veins are quick and easy and don't require a long recovery. Vein Problems Related to Varicose Veins  Many vein problems are related to varicose veins, such as telangiectasias (xst-JD-zcf-yj-DT-hu-uhs), spider veins, varicoceles (JHQ-i-pi-seals), and other vein problems. Telangiectasias  Telangiectasias are small clusters of blood vessels. They're usually found on the upper body, including the face. These blood vessels appear red. They may form during pregnancy, and often they develop in people who have certain genetic disorders, viral infections, or other conditions, such as liver disease. Because telangiectasias can be a sign of a more serious condition, see your doctor if you think you have them. Spider Veins  Spider veins are a smaller version of varicose veins and a less serious type of telangiectasias. Spider veins involve the capillaries, the smallest blood vessels in the body. Spider veins often appear on the legs and face. They're red or blue and usually look like a spider web or tree branch. These veins usually aren't a medical concern. Varicoceles  Varicoceles are varicose veins in the scrotum (the skin over the testicles). Varicoceles may be linked to male infertility. If you think you have varicoceles, see your doctor. Other Related Vein Problems  Other types of varicose veins include venous lakes, reticular veins, and hemorrhoids. Venous lakes are varicose veins that appear on the face and neck.  Reticular veins are flat blue veins often seen behind the knees. Hemorrhoids are varicose veins in and around the anus. What Causes Varicose Veins? Weak or damaged valves in the veins can cause varicose veins. After your arteries and capillaries deliver oxygen-rich blood to your body, your veins return the blood to your heart. The veins in your legs must work against gravity to do this. One-way valves inside the veins open to let blood flow through, and then they shut to keep blood from flowing backward. If the valves are weak or damaged, blood can back up and pool in your veins. This causes the veins to swell. Weak vein walls may cause weak valves. Normally, the walls of the veins are elastic (stretchy). If these walls become weak, they lose their normal elasticity. They become like an overstretched rubber band. This makes the walls of the veins longer and wider, and it causes the flaps of the valves to separate. When the valve flaps separate, blood can flow backward through the valves. The backflow of blood fills the veins and stretches the walls even more. As a result, the veins get bigger, swell, and often twist as they try to squeeze into their normal space. These are varicose veins. Normal Vein and Varicose Vein    Figure A shows a normal vein with a working valve and normal blood flow. Figure B shows a varicose vein with a deformed valve, abnormal blood flow, and thin, stretched walls. The middle image shows where varicose veins might appear in a leg. Older age or a family history of varicose veins may raise your risk for weak vein walls. You also may be at higher risk if you have increased pressure in your veins due to overweight or obesity or pregnancy. Who Is at Risk for Varicose Veins? Many factors may raise your risk for varicose veins, including family history, older age, gender, pregnancy, overweight or obesity, and lack of movement. Family History  Having family members who have varicose veins may raise your risk for the condition.  About half of all people who have varicose veins have a family history of them. Older Age  Getting older may raise your risk for varicose veins. The normal wear and tear of aging may cause the valves in your veins to weaken and not work well. Gender  Women tend to get varicose veins more often than men. Hormonal changes that occur during puberty, pregnancy, and menopause (or with the use of birth control pills) may raise a woman's risk for varicose veins. Pregnancy  During pregnancy, the growing fetus puts pressure on the veins in the mother's legs. Varicose veins that occur during pregnancy usually get better within 3 to   12 months of delivery. Overweight or Obesity  Being overweight or obese can put extra pressure on your veins. This can lead to varicose veins. For more information about overweight and obesity, go to the Health Topics Overweight and Obesity article. Lack of Movement  Standing or sitting for a long time, especially with your legs bent or crossed, may raise your risk for varicose veins. This is because staying in one position for a long time may force your veins to work harder to pump blood to your heart. What Are the Signs and Symptoms of Varicose Veins? The signs and symptoms of varicose veins include:  Large veins that you can see just under the surface of your skin. Mild swelling of your ankles and feet. Painful, achy, or \"heavy\" legs. Throbbing or cramping in your legs. Itchy legs, especially on the lower leg and ankle. Sometimes this symptom is incorrectly diagnosed as dry skin. Discolored skin in the area around the varicose vein. Signs of telangiectasias are clusters of red veins that you can see just under the surface of your skin. These clusters usually are found on the upper body, including the face. Signs of spider veins are red or blue veins in a web or tree branch pattern. Often, these veins appear on the legs and face.   See your doctor if you have these signs and symptoms. They also may be signs of other, more serious conditions. Complications of Varicose Veins  Varicose veins can lead to dermatitis (hex-xb-CG-tis), an itchy rash. If you have varicose veins in your legs, dermatitis may affect your lower leg or ankle. Dermatitis can cause bleeding or skin ulcers (sores) if the skin is scratched or irritated. Varicose veins also can lead to a condition called superficial thrombophlebitis (EWJJB-vi-guup-BI-tis). Thrombophlebitis is a blood clot in a vein. Superficial thrombophlebitis means that the blood clot occurs in a vein close to the surface of the skin. This type of blood clot may cause pain and other problems in the affected area. How Are Varicose Veins Diagnosed? Doctors often diagnose varicose veins based on a physical exam alone. Sometimes tests or procedures are used to find out the extent of the problem or to rule out other conditions. Diagnostic Tests and Procedures  Doppler Ultrasound  Your doctor may recommend a Doppler ultrasound to check blood flow in your veins and to look for blood clots. A Doppler ultrasound uses sound waves to create pictures of the structures in your body. During this test, a handheld device will be placed on your body and passed back and forth over the affected area. The device sends and receives sound waves. A computer will convert the sound waves into a picture of the blood flow in your arteries and veins. Angiogram  Your doctor may recommend an angiogram to get a more detailed look at the blood flow through your veins. For this procedure, dye is injected into your veins. The dye outlines your veins on x-ray images. An angiogram can help your doctor confirm whether you have varicose veins or another condition. How Are Varicose Veins Treated? Varicose veins are treated with lifestyle changes and medical procedures. The goals of treatment are to relieve symptoms, prevent complications, and improve appearance.   If varicose veins cause few symptoms, your doctor may simply suggest making lifestyle changes. If your symptoms are more severe, your doctor may recommend one or more medical procedures. For example, you may need a medical procedure if you have a lot of pain, blood clots, or skin disorders caused by your varicose veins. Some people who have varicose veins choose to have procedures to improve how their veins look. Although treatment can help existing varicose veins, it can't keep new varicose veins from forming. Lifestyle Changes  Lifestyle changes often are the first treatment for varicose veins. These changes can prevent varicose veins from getting worse, reduce pain, and delay other varicose veins from forming. Lifestyle changes include the following:  Avoid standing or sitting for long periods without taking a break. When sitting, avoid crossing your legs. Keep your legs raised when sitting, resting, or sleeping. When you can, raise your legs above the level of your heart. Do physical activities to get your legs moving and improve muscle tone. This helps blood move through your veins. If you're overweight or obese, try to lose weight. This will improve blood flow and ease the pressure on your veins. Avoid wearing tight clothes, especially those that are tight around your waist, groin (upper thighs), and legs. Tight clothes can make varicose veins worse. Avoid wearing high heels for long periods. Lower heeled shoes can help tone your calf muscles. Toned muscles help blood move through the veins. Your doctor may recommend compression stockings. These stockings create gentle pressure up the leg. This pressure keeps blood from pooling and decreases swelling in the legs. There are three types of compression stockings. One type is support pantyhose. These offer the least amount of pressure. A second type is over-the-counter compression hose.  These stockings give a little more pressure than support pantyhose. Over-the-counter compression hose are sold in medical supply stores and pharmacies. Prescription-strength compression hose are the third type of compression stockings. These stockings offer the greatest amount of pressure. They also are sold in medical supply stores and pharmacies. However, you need to be fitted for them in the store by a specially trained person. Medical Procedures  Medical procedures are done either to remove varicose veins or to close them. Removing or closing varicose veins usually doesn't cause problems with blood flow because the blood starts moving through other veins. You may be treated with one or more of the procedures described below. Common side effects right after most of these procedures include bruising, swelling, skin discoloration, and slight pain. The side effects are most severe with vein stripping and ligation (Aung). Rarely, this procedure can cause severe pain, infections, blood clots, and scarring. Sclerotherapy  Sclerotherapy (TYCBT-c-nubl-ah-pe) uses a liquid chemical to close off a varicose vein. The chemical is injected into the vein to cause irritation and scarring inside the vein. The irritation and scarring cause the vein to close off, and it fades away. This procedure often is used to treat smaller varicose veins and spider veins. It can be done in your doctor's office, while you stand. You may need several treatments to completely close off a vein. Treatments typically are done every 4 to 6 weeks. Following treatments, your legs will be wrapped in elastic bandaging to help with healing and decrease swelling. Microsclerotherapy  Microsclerotherapy (ZQ-vpa-RROYJ-t-zkhl-za-pe) is used to treat spider veins and other very small varicose veins. A small amount of liquid chemical is injected into a vein using a very fine needle. The chemical scars the inner lining of the vein, causing it to close off.     Laser Surgery  This procedure applies light energy from a laser onto a varicose vein. The laser light makes the vein fade away. Laser surgery mostly is used to treat smaller varicose veins. No cutting or injection of chemicals is involved. Endovenous Ablation Therapy  Endovenous ablation (ab-LA-shun) therapy uses lasers or radiowaves to create heat to close off a varicose vein. Your doctor makes a tiny cut in your skin near the varicose vein. He or she then inserts a small tube called a catheter into the vein. A device at the tip of the tube heats up the inside of the vein and closes it off. You'll be awake during this procedure, but your doctor will numb the area around the vein. You usually can go home the same day as the procedure. Endoscopic Vein Surgery  For endoscopic (wh-yi-EYKK-ik) vein surgery, your doctor will make a small cut in your skin near a varicose vein. He or she then uses a tiny camera at the end of a thin tube to move through the vein. A surgical device at the end of the camera is used to close the vein. Endoscopic vein surgery usually is used only in severe cases when varicose veins are causing skin ulcers (sores). After the procedure, you usually can return to your normal activities within a few weeks. Ambulatory Phlebectomy  For ambulatory phlebectomy (tlo-WUP-gp-me), your doctor will make small cuts in your skin to remove small varicose veins. This procedure usually is done to remove the varicose veins closest to the surface of your skin. You'll be awake during the procedure, but your doctor will numb the area around the vein. Usually, you can go home the same day that the procedure is done. Vein Stripping and Ligation  Vein stripping and ligation typically is done only for severe cases of varicose veins. The procedure involves tying shut and removing the veins through small cuts in your skin. You'll be given medicine to temporarily put you to sleep so you don't feel any pain during the procedure.   Vein stripping and ligation usually is done as an outpatient procedure. The recovery time from the procedure is about 1 to 4 weeks. How Can Varicose Veins Be Prevented? You can't prevent varicose veins from forming. However, you can prevent the ones you have from getting worse. You also can take steps to delay other varicose veins from forming. Avoid standing or sitting for long periods without taking a break. When sitting, avoid crossing your legs. Keep your legs raised when sitting, resting, or sleeping. When you can, raise your legs above the level of your heart. Do physical activities to get your legs moving and improve muscle tone. This helps blood move through your veins. If you're overweight or obese, try to lose weight. This will improve blood flow and ease the pressure on your veins. Avoid wearing tight clothes, especially those that are tight around your waist, groin (upper thighs), and legs. Tight clothes can make varicose veins worse. Avoid wearing high heels for long periods. Lower heeled shoes can help tone your calf muscles. Toned muscles help blood move through the veins. Wear compression stockings if your doctor recommends them. These stockings create gentle pressure up the leg. This pressure keeps blood from pooling in the veins and decreases swelling in the legs. Living With Varicose Veins  Varicose veins are a common condition. They often cause few signs and symptoms. If your signs and symptoms are minor, your doctor may simply suggest making lifestyle changes. If your condition is more severe--for example, if you have pain, blood clots, or skin ulcers (sores)--your doctor may recommend one or more medical procedures. Many treatments for varicose veins are quick and easy and don't require a long recovery.

## 2022-09-26 NOTE — PROGRESS NOTES
rhythm  Varicose veins L popliteal and post calf, few spider veins  SKIN: warm and dry     Assessment and Plan:      Diagnosis Orders   1. Varicose veins of left lower extremity with pain  [address] [facility], [facility] DO [place], Vascular[facility]Endovascular Surgeons, [facility]      2. Screening for diabetes mellitus  Glucose, Fasting      3. Screening cholesterol level  Lipid Panel         INSTRUCTIONS  PLEASE GET FASTING BLOODWORK DRAWN SOON. Lab is on first floor in [address]. Hours Monday to Friday 6:30 AM to 4 PM.   Please get flu vaccine when available in fall. Can get either at this office or at stores such as [employer] and [employer]. See surgeon to discuss options. Thigh high compression stockings may be the best choice  OK to try knee sleeve for now.

## 2023-01-11 DIAGNOSIS — Z13.1 SCREENING FOR DIABETES MELLITUS: ICD-10-CM

## 2023-01-11 LAB — GLUCOSE FASTING: 93 MG/DL (ref 70–99)

## 2024-03-07 ENCOUNTER — OFFICE VISIT (OUTPATIENT)
Dept: FAMILY MEDICINE CLINIC | Age: 39
End: 2024-03-07
Payer: COMMERCIAL

## 2024-03-07 VITALS
BODY MASS INDEX: 39.87 KG/M2 | WEIGHT: 254 LBS | OXYGEN SATURATION: 96 % | RESPIRATION RATE: 14 BRPM | HEART RATE: 76 BPM | HEIGHT: 67 IN

## 2024-03-07 DIAGNOSIS — R10.9 FLANK PAIN: ICD-10-CM

## 2024-03-07 DIAGNOSIS — S39.012A LUMBAR STRAIN, INITIAL ENCOUNTER: Primary | ICD-10-CM

## 2024-03-07 LAB
BILIRUBIN, POC: NORMAL
BLOOD URINE, POC: NORMAL
CLARITY, POC: CLEAR
COLOR, POC: YELLOW
GLUCOSE URINE, POC: NORMAL
KETONES, POC: NORMAL
LEUKOCYTE EST, POC: NORMAL
NITRITE, POC: NORMAL
PH, POC: 5.5
PROTEIN, POC: NORMAL
SPECIFIC GRAVITY, POC: 1.03
UROBILINOGEN, POC: 0.2

## 2024-03-07 PROCEDURE — 81002 URINALYSIS NONAUTO W/O SCOPE: CPT | Performed by: FAMILY MEDICINE

## 2024-03-07 PROCEDURE — G8417 CALC BMI ABV UP PARAM F/U: HCPCS | Performed by: FAMILY MEDICINE

## 2024-03-07 PROCEDURE — G8484 FLU IMMUNIZE NO ADMIN: HCPCS | Performed by: FAMILY MEDICINE

## 2024-03-07 PROCEDURE — G8427 DOCREV CUR MEDS BY ELIG CLIN: HCPCS | Performed by: FAMILY MEDICINE

## 2024-03-07 PROCEDURE — 1036F TOBACCO NON-USER: CPT | Performed by: FAMILY MEDICINE

## 2024-03-07 PROCEDURE — 99213 OFFICE O/P EST LOW 20 MIN: CPT | Performed by: FAMILY MEDICINE

## 2024-03-07 SDOH — ECONOMIC STABILITY: HOUSING INSECURITY
IN THE LAST 12 MONTHS, WAS THERE A TIME WHEN YOU DID NOT HAVE A STEADY PLACE TO SLEEP OR SLEPT IN A SHELTER (INCLUDING NOW)?: NO

## 2024-03-07 SDOH — ECONOMIC STABILITY: FOOD INSECURITY: WITHIN THE PAST 12 MONTHS, THE FOOD YOU BOUGHT JUST DIDN'T LAST AND YOU DIDN'T HAVE MONEY TO GET MORE.: NEVER TRUE

## 2024-03-07 SDOH — ECONOMIC STABILITY: INCOME INSECURITY: HOW HARD IS IT FOR YOU TO PAY FOR THE VERY BASICS LIKE FOOD, HOUSING, MEDICAL CARE, AND HEATING?: NOT HARD AT ALL

## 2024-03-07 SDOH — ECONOMIC STABILITY: FOOD INSECURITY: WITHIN THE PAST 12 MONTHS, YOU WORRIED THAT YOUR FOOD WOULD RUN OUT BEFORE YOU GOT MONEY TO BUY MORE.: NEVER TRUE

## 2024-03-07 ASSESSMENT — PATIENT HEALTH QUESTIONNAIRE - PHQ9
SUM OF ALL RESPONSES TO PHQ QUESTIONS 1-9: 0
2. FEELING DOWN, DEPRESSED OR HOPELESS: 0
SUM OF ALL RESPONSES TO PHQ QUESTIONS 1-9: 0
1. LITTLE INTEREST OR PLEASURE IN DOING THINGS: 0
SUM OF ALL RESPONSES TO PHQ9 QUESTIONS 1 & 2: 0

## 2024-03-07 NOTE — PATIENT INSTRUCTIONS
It helps to breathe out as you lift your shoulders up. Relax. Repeat 10 times. Build to 3 sets of 10. To challenge yourself, clasp your hands behind your head and keep your elbows out to the side.   Lower trunk rotation: Lie on your back with your knees bent and your feet flat on the floor. Tighten your abdominal muscles and push your lower back into the floor. Keeping your shoulders down flat, gently rotate your legs to one side, then the other as far as you can. Repeat 10 to 20 times.   Single knee to chest stretch: Lie on your back with your legs straight out in front of you. Bring one knee up to your chest and grasp the back of your thigh. Pull your knee toward your chest, stretching your buttock muscle. Hold this position for 15 to 30 seconds and return to the starting position. Repeat 3 times on each side.   Double knee to chest: Lie on your back with your knees bent and your feet flat on the floor. Tighten your abdominal muscles and push your lower back into the floor. Pull both knees up to your chest. Hold for 5 seconds and repeat 10 to 20 times.     How can I take care of myself?   In addition to the treatment described above, keep in mind these suggestions:   Use an electric heating pad on a low setting (or a hot water bottle wrapped in a towel to avoid burning yourself) for 20 to 30 minutes. Don't let the heating pad get too hot, and don't fall asleep with it. You could get a burn.   Try putting an ice pack wrapped in a towel on your back for 20 minutes, one to four times a day. Set an alarm to avoid frostbite from using the ice pack too long.   Put a pillow under your knees when you are lying down.   Sleep without a pillow under your head.   Lose weight if you are overweight.   Practice good posture. Stand with your head up, shoulders straight, chest forward, weight balanced evenly on both feet, and pelvis tucked in.   Pain is the best way to  the pace you should set in increasing your activity and

## 2024-03-07 NOTE — PROGRESS NOTES
BACK PAIN VISIT    Subjective:     Chief Complaint   Patient presents with    Flank Pain     Low back pain on Rt side in kidney area for 1 week. Not having urinary symptoms.      Patient complains of back pain. Onset of symptoms was abrupt 5 days ago with improving course since that time.  The pain is located at the right lumbar area.   The pain is described as sharp and occurs intermittently. Tight. Can radiate across lower back  Exacerbated by deep breathing and after prolonged lay or sit.   Relieved by nothing. Aleve no help  It was not related to injury.     CHART REVIEW  Health Maintenance   Topic Date Due    Hepatitis B vaccine (1 of 3 - 3-dose series) Never done    Hepatitis C screen  Never done    Flu vaccine (1) Never done    COVID-19 Vaccine (4 - 2023-24 season) 09/01/2023    Depression Screen  09/26/2023    DTaP/Tdap/Td vaccine (2 - Td or Tdap) 09/03/2024    Hepatitis A vaccine  Aged Out    Hib vaccine  Aged Out    HPV vaccine  Aged Out    Polio vaccine  Aged Out    Meningococcal (ACWY) vaccine  Aged Out    Pneumococcal 0-64 years Vaccine  Aged Out    Varicella vaccine  Discontinued    Diabetes screen  Discontinued    HIV screen  Discontinued     The ASCVD Risk score (Tennille SILVA, et al., 2019) failed to calculate for the following reasons:    The 2019 ASCVD risk score is only valid for ages 40 to 79  Prior to Visit Medications    Medication Sig Taking? Authorizing Provider   inFLIXimab (REMICADE) 100 MG injection Infuse 60 mLs intravenously See Admin Instructions Per GI  Patient not taking: Reported on 3/7/2024  Nina Titus MD      No family history on file.  Social History     Tobacco Use    Smoking status: Never    Smokeless tobacco: Never    Tobacco comments:     advised not to start   Substance Use Topics    Alcohol use: Never    Drug use: No      LAST LABS  No results found for: \"CHOL\", \"LDL\", \"LDLCALC\"No results found for: \"HDL\"No results found for: \"TRIG\"  Lab Results   Component Value Date

## 2024-03-20 ENCOUNTER — OFFICE VISIT (OUTPATIENT)
Dept: SLEEP MEDICINE | Age: 39
End: 2024-03-20
Payer: COMMERCIAL

## 2024-03-20 VITALS
WEIGHT: 253.8 LBS | DIASTOLIC BLOOD PRESSURE: 70 MMHG | OXYGEN SATURATION: 99 % | HEIGHT: 67 IN | RESPIRATION RATE: 18 BRPM | TEMPERATURE: 97.9 F | HEART RATE: 78 BPM | BODY MASS INDEX: 39.83 KG/M2 | SYSTOLIC BLOOD PRESSURE: 132 MMHG

## 2024-03-20 DIAGNOSIS — E66.01 CLASS 2 SEVERE OBESITY DUE TO EXCESS CALORIES WITH SERIOUS COMORBIDITY AND BODY MASS INDEX (BMI) OF 39.0 TO 39.9 IN ADULT (HCC): ICD-10-CM

## 2024-03-20 DIAGNOSIS — Z99.89 DEPENDENCE ON OTHER ENABLING MACHINES AND DEVICES: ICD-10-CM

## 2024-03-20 DIAGNOSIS — G47.33 OSA ON CPAP: Primary | ICD-10-CM

## 2024-03-20 PROCEDURE — G8427 DOCREV CUR MEDS BY ELIG CLIN: HCPCS | Performed by: PSYCHIATRY & NEUROLOGY

## 2024-03-20 PROCEDURE — 1036F TOBACCO NON-USER: CPT | Performed by: PSYCHIATRY & NEUROLOGY

## 2024-03-20 PROCEDURE — G8417 CALC BMI ABV UP PARAM F/U: HCPCS | Performed by: PSYCHIATRY & NEUROLOGY

## 2024-03-20 PROCEDURE — G8484 FLU IMMUNIZE NO ADMIN: HCPCS | Performed by: PSYCHIATRY & NEUROLOGY

## 2024-03-20 PROCEDURE — 99214 OFFICE O/P EST MOD 30 MIN: CPT | Performed by: PSYCHIATRY & NEUROLOGY

## 2024-03-20 ASSESSMENT — SLEEP AND FATIGUE QUESTIONNAIRES
HOW LIKELY ARE YOU TO NOD OFF OR FALL ASLEEP WHILE SITTING QUIETLY AFTER LUNCH WITHOUT ALCOHOL: WOULD NEVER DOZE
HOW LIKELY ARE YOU TO NOD OFF OR FALL ASLEEP WHILE LYING DOWN TO REST IN THE AFTERNOON WHEN CIRCUMSTANCES PERMIT: MODERATE CHANCE OF DOZING
HOW LIKELY ARE YOU TO NOD OFF OR FALL ASLEEP IN A CAR, WHILE STOPPED FOR A FEW MINUTES IN TRAFFIC: WOULD NEVER DOZE
HOW LIKELY ARE YOU TO NOD OFF OR FALL ASLEEP WHILE SITTING AND TALKING TO SOMEONE: WOULD NEVER DOZE
HOW LIKELY ARE YOU TO NOD OFF OR FALL ASLEEP WHILE SITTING AND READING: WOULD NEVER DOZE
HOW LIKELY ARE YOU TO NOD OFF OR FALL ASLEEP WHEN YOU ARE A PASSENGER IN A CAR FOR AN HOUR WITHOUT A BREAK: WOULD NEVER DOZE
ESS TOTAL SCORE: 3
HOW LIKELY ARE YOU TO NOD OFF OR FALL ASLEEP WHILE SITTING INACTIVE IN A PUBLIC PLACE: WOULD NEVER DOZE
HOW LIKELY ARE YOU TO NOD OFF OR FALL ASLEEP WHILE WATCHING TV: SLIGHT CHANCE OF DOZING

## 2024-03-20 NOTE — PROGRESS NOTES
history.    History reviewed. No pertinent family history.    Review of Systems    Objective:     Vitals:  Weight BMI Neck circumference    Wt Readings from Last 3 Encounters:   03/20/24 115.1 kg (253 lb 12.8 oz)   03/07/24 115.2 kg (254 lb)   09/26/22 116.1 kg (256 lb)    Body mass index is 39.75 kg/m².       BP HR SaO2   BP Readings from Last 3 Encounters:   03/20/24 132/70   09/26/22 120/72   11/30/21 114/78    Pulse Readings from Last 3 Encounters:   03/20/24 78   03/07/24 76   09/26/22 88    SpO2 Readings from Last 3 Encounters:   03/20/24 99%   03/07/24 96%   09/26/22 97%        Themandibular molar Class :   [x]1 []2 []3      Mallampati I Airway Classification:   []1 [x]2 []3 []4      Physical Exam  Constitutional:       Appearance: Normal appearance. He is obese.   Cardiovascular:      Rate and Rhythm: Normal rate and regular rhythm.   Pulmonary:      Effort: Pulmonary effort is normal.      Breath sounds: Normal breath sounds.   Neurological:      Mental Status: He is alert.         :   Drives small box truck.      Diagnosis Orders   1. ERIC on CPAP        2. Dependence on other enabling machines and devices        3. Class 2 severe obesity due to excess calories with serious comorbidity and body mass index (BMI) of 39.0 to 39.9 in adult (HCC)            Assessment/Plan:   1. Mild Obstructive sleep apnea syndrome syndrome   Assessment & Plan:  Chronic, not stable at this time, on PAP at 11 cmwp, I will continue the PAP at 5-15 cmwp.  I Encouraged the patient to use the machine on nightly basis, at least 4 hours a night.  I instructed the patient to adjust the humidifier as needed for dry mouth.    Reviewed and analyzed results of physiologic download from patient's machine and reviewed with patient.  Supplies and parts as needed for her machine.  These are medically necessary.               We clear the patient for driving after he uses the machine 705 more than  4 hours a night.     2. Morbid

## 2024-03-20 NOTE — PROGRESS NOTES
Rich Zambrano         : 1985  [x] MSC     [] [facility]      [] [facility]     [][facility]    [] [facility]  [] [facility]  [] [facility]   [] [facility] [] Other:  Diagnosis: [x] ERIC (G47.33) [] CSA (G47.31) [] Apnea (G47.30)   Length of Need: [] 12 Months [x] 99 Months [] Other:    Machine (PEGGY!):  [x] [employer]Med AirSense     Auto [] Other:         Humidifier: [x] Heated ()        [x] Water chamber replacement ()/ 1 per 6 months        Mask:  Please always start with the mask the patient used during the titraion   [x] Nasal () /1 per 3 months    [x] Patient choice -Size and fit mask    [] Dispense:     [x] Headgear () / 1 per 6 months        [x] Replacement Nasal Pillows ()/2 per month         Tubing: [x] Heated ()/1 per 3 months    [] Standard ()/1 per 3 months [] Other:           Filters: [x] Non-disposable ()/1 per 6 months     [x] Ultra-Fine, Disposable ()/2 per month        Miscellaneous: [x] Chin Strap ()/ 1 per 6 months [] O2 bleed-in:       LPM   [] Oximetry on CPAP/Bilevel []  Other:          Start Order Date: 24    MEDICAL JUSTIFICATION:  I, the undersigned, certify that the above prescribed supplies are medically necessary for this patient’s wellbeing.  In my opinion, the supplies are both reasonable and necessary in reference to accepted standards of medicalpractice in treatment of this patient’s condition.    Candy Dela Cruz MD      NPI: [id number]       Order Signed Date: 24    Electronically signed by Candy Dela Cruz MD on 3/20/2024 at 12:14 PM

## 2024-04-01 ENCOUNTER — OFFICE VISIT (OUTPATIENT)
Dept: FAMILY MEDICINE CLINIC | Age: 39
End: 2024-04-01
Payer: COMMERCIAL

## 2024-04-01 VITALS
HEART RATE: 90 BPM | SYSTOLIC BLOOD PRESSURE: 118 MMHG | WEIGHT: 253 LBS | DIASTOLIC BLOOD PRESSURE: 74 MMHG | HEIGHT: 67 IN | BODY MASS INDEX: 39.71 KG/M2 | TEMPERATURE: 98.6 F | OXYGEN SATURATION: 98 %

## 2024-04-01 DIAGNOSIS — L72.3 SEBACEOUS CYST: Primary | ICD-10-CM

## 2024-04-01 PROCEDURE — 1036F TOBACCO NON-USER: CPT

## 2024-04-01 PROCEDURE — G8417 CALC BMI ABV UP PARAM F/U: HCPCS

## 2024-04-01 PROCEDURE — 99213 OFFICE O/P EST LOW 20 MIN: CPT

## 2024-04-01 PROCEDURE — G8427 DOCREV CUR MEDS BY ELIG CLIN: HCPCS

## 2024-04-01 NOTE — PROGRESS NOTES
4/1/2024    This is a 38 y.o. male   Chief Complaint   Patient presents with    Lesion(s)     Has 1 lesion/blackhead/pimple on left side of dorsal back. Has gotten bigger and more painful in the last 2 weeks.    .    HPI    Has spot on his left back- gotten bigger over the past 2 weeks  Firm and tender- not draining    He has had this spot for a long time and his wife has been able to squeeze it and drain some white/creamy/milky discharge out of it in the past, but now she is unable to squeeze or drain anything from it.  It has become more firm     Patient Active Problem List   Diagnosis    Crohn disease (HCC)    Obstructive sleep apnea       Current Outpatient Medications   Medication Sig Dispense Refill    inFLIXimab (REMICADE) 100 MG injection Infuse 5 mg/kg intravenously See Admin Instructions Per GI 1 each 4     No current facility-administered medications for this visit.       No Known Allergies    Review of Systems   Constitutional:  Negative for fever.   Skin:  Negative for color change.   Hematological:  Negative for adenopathy.       Vitals:    04/01/24 1459   BP: 118/74   Site: Left Upper Arm   Position: Sitting   Cuff Size: Large Adult   Pulse: 90   Temp: 98.6 °F (37 °C)   TempSrc: Oral   SpO2: 98%   Weight: 114.8 kg (253 lb)   Height: 1.702 m (5' 7\")       Body mass index is 39.63 kg/m².     Wt Readings from Last 3 Encounters:   04/01/24 114.8 kg (253 lb)   03/20/24 115.1 kg (253 lb 12.8 oz)   03/07/24 115.2 kg (254 lb)       BP Readings from Last 3 Encounters:   04/01/24 118/74   03/20/24 132/70   09/26/22 120/72       Physical Exam  Constitutional:       Appearance: Normal appearance.   HENT:      Head: Normocephalic and atraumatic.   Pulmonary:      Effort: Pulmonary effort is normal.   Skin:     Comments: 2 cm x 2 cm sebaceous cyst- non-infected on lower, left, lateral back.  Nonfluctuant   Neurological:      Mental Status: He is alert.         Assessmentand Plan  Rich was seen today for

## 2024-04-02 ASSESSMENT — ENCOUNTER SYMPTOMS: COLOR CHANGE: 0

## 2024-04-03 ENCOUNTER — OFFICE VISIT (OUTPATIENT)
Dept: SURGERY | Age: 39
End: 2024-04-03
Payer: COMMERCIAL

## 2024-04-03 VITALS — BODY MASS INDEX: 39.63 KG/M2 | WEIGHT: 253 LBS

## 2024-04-03 DIAGNOSIS — D23.5 DERMOID CYST OF SKIN OF BACK: Primary | ICD-10-CM

## 2024-04-03 PROCEDURE — G8427 DOCREV CUR MEDS BY ELIG CLIN: HCPCS | Performed by: STUDENT IN AN ORGANIZED HEALTH CARE EDUCATION/TRAINING PROGRAM

## 2024-04-03 PROCEDURE — G8417 CALC BMI ABV UP PARAM F/U: HCPCS | Performed by: STUDENT IN AN ORGANIZED HEALTH CARE EDUCATION/TRAINING PROGRAM

## 2024-04-03 PROCEDURE — 99203 OFFICE O/P NEW LOW 30 MIN: CPT | Performed by: STUDENT IN AN ORGANIZED HEALTH CARE EDUCATION/TRAINING PROGRAM

## 2024-04-03 PROCEDURE — 10061 I&D ABSCESS COMP/MULTIPLE: CPT | Performed by: STUDENT IN AN ORGANIZED HEALTH CARE EDUCATION/TRAINING PROGRAM

## 2024-04-03 NOTE — PROGRESS NOTES
GENERAL SURGERY  Office Note    Patient Name: Rich Zambrano  MRN: [id number]  YOB: 1985   Date of Service: 4/3/2024    Referred by:  Nina Titus MD    Chief Complaint   Patient presents with    New Patient     Ref by Dr. Titus for cyst on back. Pt states cyst has been there for years, painful, denies any drainage.        SUBJECTIVE:     Rich is a 38 y.o. male.  He presents with a cyst on his left mid back.  This has been present for several years, but recently became more inflamed, swollen, and painful.  He has not been treated with antibiotics.  He has never had operations on the site before.  He has never had cysts of this nature before.  He has no systemic symptoms, including fever, chills, chest pain, shortness of breath.  He takes no blood thinners.  The cyst does not spontaneously draining or bleeding.  It is sore.      REVIEW OF SYSTEMS  A comprehensive review of systems was completed and is negative except for any elements noted above.    Past Medical History:   Diagnosis Date    Crohn disease (HCC) 8/29/2019     No past surgical history on file.  Prior to Admission medications    Medication Sig Start Date End Date Taking? Authorizing Provider   inFLIXimab (REMICADE) 100 MG injection Infuse 5 mg/kg intravenously See Admin Instructions Per GI 11/30/21  Yes Nina Titus MD     Social History     Tobacco Use    Smoking status: Never     Passive exposure: Never    Smokeless tobacco: Never    Tobacco comments:     advised not to start   Vaping Use    Vaping Use: Never used   Substance Use Topics    Alcohol use: Never    Drug use: No     No family history on file.    OBJECTIVE:     Wt 114.8 kg (253 lb)   BMI 39.63 kg/m²     PHYSICAL EXAM  General/appearance: Awake and alert, in no acute distress  Lungs: Respirations unlabored  Breast: Deferred  Cardiac: Regular rate  Abdomen: soft, non-distended, nontender  Hernia: None  Rectal: Deferred  Incision: None  Extremities: Warm and well

## 2024-04-24 ENCOUNTER — OFFICE VISIT (OUTPATIENT)
Dept: SLEEP MEDICINE | Age: 39
End: 2024-04-24
Payer: COMMERCIAL

## 2024-04-24 VITALS
WEIGHT: 247.8 LBS | HEART RATE: 71 BPM | BODY MASS INDEX: 38.89 KG/M2 | DIASTOLIC BLOOD PRESSURE: 70 MMHG | TEMPERATURE: 97.8 F | SYSTOLIC BLOOD PRESSURE: 115 MMHG | OXYGEN SATURATION: 98 % | RESPIRATION RATE: 18 BRPM | HEIGHT: 67 IN

## 2024-04-24 DIAGNOSIS — Z99.89 DEPENDENCE ON OTHER ENABLING MACHINES AND DEVICES: ICD-10-CM

## 2024-04-24 DIAGNOSIS — G47.33 OSA ON CPAP: Primary | ICD-10-CM

## 2024-04-24 DIAGNOSIS — E66.01 CLASS 2 SEVERE OBESITY DUE TO EXCESS CALORIES WITH SERIOUS COMORBIDITY AND BODY MASS INDEX (BMI) OF 39.0 TO 39.9 IN ADULT (HCC): ICD-10-CM

## 2024-04-24 PROCEDURE — G8417 CALC BMI ABV UP PARAM F/U: HCPCS | Performed by: PSYCHIATRY & NEUROLOGY

## 2024-04-24 PROCEDURE — 1036F TOBACCO NON-USER: CPT | Performed by: PSYCHIATRY & NEUROLOGY

## 2024-04-24 PROCEDURE — 99214 OFFICE O/P EST MOD 30 MIN: CPT | Performed by: PSYCHIATRY & NEUROLOGY

## 2024-04-24 PROCEDURE — G8427 DOCREV CUR MEDS BY ELIG CLIN: HCPCS | Performed by: PSYCHIATRY & NEUROLOGY

## 2024-04-24 ASSESSMENT — SLEEP AND FATIGUE QUESTIONNAIRES
HOW LIKELY ARE YOU TO NOD OFF OR FALL ASLEEP WHILE SITTING INACTIVE IN A PUBLIC PLACE: WOULD NEVER DOZE
HOW LIKELY ARE YOU TO NOD OFF OR FALL ASLEEP WHILE SITTING AND TALKING TO SOMEONE: WOULD NEVER DOZE
HOW LIKELY ARE YOU TO NOD OFF OR FALL ASLEEP WHILE WATCHING TV: WOULD NEVER DOZE
HOW LIKELY ARE YOU TO NOD OFF OR FALL ASLEEP IN A CAR, WHILE STOPPED FOR A FEW MINUTES IN TRAFFIC: WOULD NEVER DOZE
HOW LIKELY ARE YOU TO NOD OFF OR FALL ASLEEP WHILE LYING DOWN TO REST IN THE AFTERNOON WHEN CIRCUMSTANCES PERMIT: MODERATE CHANCE OF DOZING
HOW LIKELY ARE YOU TO NOD OFF OR FALL ASLEEP WHILE SITTING QUIETLY AFTER LUNCH WITHOUT ALCOHOL: WOULD NEVER DOZE
HOW LIKELY ARE YOU TO NOD OFF OR FALL ASLEEP WHILE SITTING AND READING: WOULD NEVER DOZE
ESS TOTAL SCORE: 2
HOW LIKELY ARE YOU TO NOD OFF OR FALL ASLEEP WHEN YOU ARE A PASSENGER IN A CAR FOR AN HOUR WITHOUT A BREAK: WOULD NEVER DOZE

## 2024-04-24 NOTE — PROGRESS NOTES
BMI Neck circumference    Wt Readings from Last 3 Encounters:   04/24/24 112.4 kg (247 lb 12.8 oz)   04/03/24 114.8 kg (253 lb)   04/01/24 114.8 kg (253 lb)    Body mass index is 38.81 kg/m².       BP HR SaO2   BP Readings from Last 3 Encounters:   04/24/24 115/70   04/01/24 118/74   03/20/24 132/70    Pulse Readings from Last 3 Encounters:   04/24/24 71   04/01/24 90   03/20/24 78    SpO2 Readings from Last 3 Encounters:   04/24/24 98%   04/01/24 98%   03/20/24 99%        Themandibular molar Class :   [x]1 []2 []3      Mallampati I Airway Classification:   []1 [x]2 []3 []4      Physical Exam    :        Diagnosis Orders   1. ERIC on CPAP        2. Dependence on other enabling machines and devices        3. Class 2 severe obesity due to excess calories with serious comorbidity and body mass index (BMI) of 39.0 to 39.9 in adult (HCC)          DOT   Assessment/Plan:   1. Mild Obstructive sleep apnea syndrome syndrome   Assessment & Plan:  Chronic, stable, on PAP at 10.7 cmwp, I will continue the PAP at 5-15 cmwp.  I Encouraged the patient to use the machine on nightly basis, at least 4 hours a night.  I instructed the patient to adjust the humidifier as needed for dry mouth.    Reviewed and analyzed results of physiologic download from patient's machine and reviewed with patient.  Supplies and parts as needed for her machine.  These are medically necessary.  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        3. Morbid obesity:  Assessment & Plan:  Chronic-not stable:    The proportionality between weight and AHI.  With 10% weight change, the AHI has a 27% proportionate change.  With 20% weight change, the AHI has a 45-50% proportionate change.         No orders of the defined types were placed in this encounter.      Return in about 1 year (around 4/24/2025) for Reveiwing CPAP usage and compliance report and

## 2024-05-03 ENCOUNTER — TELEPHONE (OUTPATIENT)
Dept: PULMONOLOGY | Age: 39
End: 2024-05-03

## 2024-05-03 NOTE — TELEPHONE ENCOUNTER
Pt called 8;30 original message  1:15 to see if this was taken care of  Message was not routed to Dr Dela Cruz til I sent it--pt upset it sat in our box until he called--I was running Dr Vaughan  I understand he has 2 days to do this but b/c it was 830 am when he called   Dr Dela Cruz would of seen it sooner    If it was routed back when he called the first time-    Letter and compliance faxed  Pt notified

## 2024-05-03 NOTE — TELEPHONE ENCOUNTER
You can give him a copy of the last note, or type a letter, this message was sent to me 15 minutes ago.

## 2024-05-03 NOTE — TELEPHONE ENCOUNTER
Pt upset this message sat all day  This is urgent   from this place got on phone pt needs a letter on letterhead with Dr Dela Cruz signature that pt is compliant with his sleep treatment

## 2024-05-03 NOTE — TELEPHONE ENCOUNTER
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  He asked that we call first to verify exactly what they need.

## 2024-05-03 NOTE — TELEPHONE ENCOUNTER
Letter typed and compliance report in media.  Can't fax at [facility] today.  Domi is asking MA at [facility] to fax for us

## 2024-05-03 NOTE — TELEPHONE ENCOUNTER
Noone has been able to print from this office all day long. Multiple tickets were opened, and then closed by IT stating they had resolved the issue but had not.  We finally got the letter printed, Lanie has signed the letter, and I tried to fax it. I'm getting  an error message. Can someone please print all the requested documents out and fax them from [facility]? Dr Dela Cruz stated he has 2 business days to process any requests from patients, and has been with patients all day every 20 minutes. Thank you!

## 2024-05-06 NOTE — TELEPHONE ENCOUNTER
Rich calls in stating they never received the fax.  Fax# provided is a disconnected number.  He will call us back with the correct fax number.  I faxed it Friday after my printer would print to [phone] and it was successful. That's what I had written down on my notepad as the fax #

## 2025-02-04 ENCOUNTER — OFFICE VISIT (OUTPATIENT)
Dept: ORTHOPEDIC SURGERY | Age: 40
End: 2025-02-04
Payer: COMMERCIAL

## 2025-02-04 VITALS — BODY MASS INDEX: 38.77 KG/M2 | HEIGHT: 67 IN | WEIGHT: 247 LBS

## 2025-02-04 DIAGNOSIS — M51.16 LUMBAR DISC HERNIATION WITH RADICULOPATHY: ICD-10-CM

## 2025-02-04 DIAGNOSIS — M47.816 LUMBAR SPONDYLOSIS: Primary | ICD-10-CM

## 2025-02-04 PROCEDURE — 99204 OFFICE O/P NEW MOD 45 MIN: CPT | Performed by: ORTHOPAEDIC SURGERY

## 2025-02-04 RX ORDER — GABAPENTIN 300 MG/1
300 CAPSULE ORAL 4 TIMES DAILY
Qty: 120 CAPSULE | Refills: 1 | Status: SHIPPED | OUTPATIENT
Start: 2025-02-04 | End: 2025-04-05

## 2025-02-04 RX ORDER — METHYLPREDNISOLONE 4 MG/1
TABLET ORAL
Qty: 1 KIT | Refills: 0 | Status: SHIPPED | OUTPATIENT
Start: 2025-02-04 | End: 2025-02-10

## 2025-02-04 NOTE — PROGRESS NOTES
New Patient: LUMBAR SPINE    Referring Provider:  Dr Nina Titus    CHIEF COMPLAINT:    Chief Complaint   Patient presents with    Back Pain     lumbar       HISTORY OF PRESENT ILLNESS:    Mr. Rich Zambrano returns today with a 3-week history of severe low back and right leg pain and numbness after shoveling snow..  He denies saddle anesthesia or bowel bladder abnormality.    Current/Past Treatment:   Physical Therapy: Yes  Chiropractic: None  Injection: None  Medications: none    Past Medical History:   Past Medical History:   Diagnosis Date    Crohn disease (HCC) 08/29/2019    Sleep apnea       Past Surgical History:     History reviewed. No pertinent surgical history.  Current Medications:     Current Outpatient Medications:     inFLIXimab (REMICADE) 100 MG injection, Infuse 5 mg/kg intravenously See Admin Instructions Per GI, Disp: 1 each, Rfl: 4  Allergies:  Patient has no known allergies.  Social History:    reports that he has never smoked. He has never been exposed to tobacco smoke. He has never used smokeless tobacco. He reports that he does not drink alcohol and does not use drugs.  Family History:   Family History   Problem Relation Age of Onset    Sleep Apnea Mother     Sleep Apnea Father        REVIEW OF SYSTEMS: Full ROS noted & scanned   CONSTITUTIONAL: Denies unexplained weight loss, fevers, chills or fatigue  NEUROLOGICAL: Denies unsteady gait or progressive weakness  MUSCULOSKELETAL: Denies joint swelling or redness  PSYCHOLOGICAL: Denies anxiety, depression   SKIN: Denies skin changes, delayed healing, rash, itching   HEMATOLOGIC: Denies easy bleeding or bruising  ENDOCRINE: Denies excessive thirst, urination, heat/cold  RESPIRATORY: Denies current dyspnea, cough  GI: Denies nausea, vomiting, diarrhea   : Denies bowel or bladder issues      PHYSICAL EXAM:    Vitals: Height 1.702 m (5' 7.01\"), weight 112 kg (247 lb).    GENERAL EXAM:  General Apparence: Patient is adequately groomed with no

## 2025-02-06 ENCOUNTER — TELEPHONE (OUTPATIENT)
Dept: ORTHOPEDIC SURGERY | Age: 40
End: 2025-02-06

## 2025-02-06 NOTE — TELEPHONE ENCOUNTER
[facility] SCHULZ     SPEED THINGS     General Question     Subject: Sepideh Zambrano   Patient and /or Facility Request: Rich Zambrano   Contact Number: [phone] OR THE Pt  @ [phone]     PATIENT WOKE IN MIDDLE OF THE NIGHT, LAST NIGHT, AND WENT TO ER. HE HE HAD SHOOTING NERVE PAIN, LIKE DR SCHULZ DIAGNOSED.     1)    THE ER HAS HIM HEAVILY MEDICATED, AND ARE REFERRING TO [facility]. [facility] CAN'T SCHEDULE UNTIL APRIL. [facility] IS ALSO ASKING FOR A REFERRAL FROM DR SCHULZ, NOT JUST THE ER. WILL YOU SEND REFERRAL FOR HIM?     2)     IS THERE ANY SORT OF INJECTION, STEROID OR OTHERWISE, THE Pt MIGHT GET IN THE MEANTIME, TO REGAIN SOME NORMALCY AND RELIEF? THE ER SAID TO DISCUSS OPTIONS WITH DR SCHULZ.     PLEASE CALL TO ADVISE.

## 2025-03-18 ENCOUNTER — APPOINTMENT (OUTPATIENT)
Dept: CT IMAGING | Age: 40
End: 2025-03-18
Payer: COMMERCIAL

## 2025-03-18 ENCOUNTER — APPOINTMENT (OUTPATIENT)
Dept: GENERAL RADIOLOGY | Age: 40
End: 2025-03-18
Payer: COMMERCIAL

## 2025-03-18 ENCOUNTER — HOSPITAL ENCOUNTER (INPATIENT)
Age: 40
LOS: 3 days | Discharge: HOME | End: 2025-03-21
Attending: INTERNAL MEDICINE | Admitting: INTERNAL MEDICINE
Payer: COMMERCIAL

## 2025-03-18 DIAGNOSIS — A41.9 SEVERE SEPSIS: Primary | ICD-10-CM

## 2025-03-18 DIAGNOSIS — R65.20 SEVERE SEPSIS: Primary | ICD-10-CM

## 2025-03-18 DIAGNOSIS — A09 INFECTIOUS COLITIS: ICD-10-CM

## 2025-03-18 LAB
ALBUMIN SERPL-MCNC: 4 G/DL (ref 3.4–5)
ALBUMIN/GLOB SERPL: 1.1 {RATIO} (ref 1.1–2.2)
ALP SERPL-CCNC: 85 U/L (ref 40–129)
ALT SERPL-CCNC: 24 U/L (ref 10–40)
ANION GAP SERPL CALCULATED.3IONS-SCNC: 12 MMOL/L (ref 3–16)
AST SERPL-CCNC: 28 U/L (ref 15–37)
BASOPHILS # BLD: 0 K/UL (ref 0–0.2)
BASOPHILS NFR BLD: 0.1 %
BILIRUB SERPL-MCNC: 0.8 MG/DL (ref 0–1)
BILIRUB UR QL STRIP.AUTO: NEGATIVE
BUN SERPL-MCNC: 8 MG/DL (ref 7–20)
CALCIUM SERPL-MCNC: 9.5 MG/DL (ref 8.3–10.6)
CHLORIDE SERPL-SCNC: 103 MMOL/L (ref 99–110)
CLARITY UR: CLEAR
CO2 SERPL-SCNC: 25 MMOL/L (ref 21–32)
COLOR UR: YELLOW
CREAT SERPL-MCNC: 0.9 MG/DL (ref 0.9–1.3)
CRP SERPL-MCNC: 35.7 MG/L (ref 0–5.1)
DEPRECATED RDW RBC AUTO: 13.1 % (ref 12.4–15.4)
EOSINOPHIL # BLD: 0 K/UL (ref 0–0.6)
EOSINOPHIL NFR BLD: 0.2 %
ERYTHROCYTE [SEDIMENTATION RATE] IN BLOOD BY WESTERGREN METHOD: 30 MM/HR (ref 0–15)
FLUAV + FLUBV AG NOSE IA.RAPID: NOT DETECTED
FLUAV + FLUBV AG NOSE IA.RAPID: NOT DETECTED
GFR SERPLBLD CREATININE-BSD FMLA CKD-EPI: >90 ML/MIN/{1.73_M2}
GLUCOSE SERPL-MCNC: 108 MG/DL (ref 70–99)
GLUCOSE UR STRIP.AUTO-MCNC: NEGATIVE MG/DL
HCT VFR BLD AUTO: 45 % (ref 40.5–52.5)
HGB BLD-MCNC: 15.2 G/DL (ref 13.5–17.5)
HGB UR QL STRIP.AUTO: NEGATIVE
KETONES UR STRIP.AUTO-MCNC: NEGATIVE MG/DL
LACTATE BLDV-SCNC: 1.3 MMOL/L (ref 0.4–2)
LACTATE BLDV-SCNC: 2.2 MMOL/L (ref 0.4–2)
LEUKOCYTE ESTERASE UR QL STRIP.AUTO: NEGATIVE
LIPASE SERPL-CCNC: 17 U/L (ref 13–60)
LYMPHOCYTES # BLD: 0.2 K/UL (ref 1–5.1)
LYMPHOCYTES NFR BLD: 7.2 %
MCH RBC QN AUTO: 29.9 PG (ref 26–34)
MCHC RBC AUTO-ENTMCNC: 33.8 G/DL (ref 31–36)
MCV RBC AUTO: 88.5 FL (ref 80–100)
MONOCYTES # BLD: 0 K/UL (ref 0–1.3)
MONOCYTES NFR BLD: 0.9 %
NEUTROPHILS # BLD: 3.1 K/UL (ref 1.7–7.7)
NEUTROPHILS NFR BLD: 91.6 %
NITRITE UR QL STRIP.AUTO: NEGATIVE
PH UR STRIP.AUTO: 5.5 [PH] (ref 5–8)
PLATELET # BLD AUTO: 301 K/UL (ref 135–450)
PMV BLD AUTO: 7.4 FL (ref 5–10.5)
POTASSIUM SERPL-SCNC: 3.7 MMOL/L (ref 3.5–5.1)
PROCALCITONIN SERPL IA-MCNC: 1.48 NG/ML (ref 0–0.15)
PROT SERPL-MCNC: 7.6 G/DL (ref 6.4–8.2)
PROT UR STRIP.AUTO-MCNC: NEGATIVE MG/DL
RBC # BLD AUTO: 5.09 M/UL (ref 4.2–5.9)
SARS-COV-2 RDRP RESP QL NAA+PROBE: NOT DETECTED
SODIUM SERPL-SCNC: 140 MMOL/L (ref 136–145)
SP GR UR STRIP.AUTO: 1.02 (ref 1–1.03)
UA COMPLETE W REFLEX CULTURE PNL UR: NORMAL
UA DIPSTICK W REFLEX MICRO PNL UR: NORMAL
URN SPEC COLLECT METH UR: NORMAL
UROBILINOGEN UR STRIP-ACNC: 0.2 E.U./DL
WBC # BLD AUTO: 3.4 K/UL (ref 4–11)

## 2025-03-18 PROCEDURE — 71045 X-RAY EXAM CHEST 1 VIEW: CPT

## 2025-03-18 PROCEDURE — 96365 THER/PROPH/DIAG IV INF INIT: CPT

## 2025-03-18 PROCEDURE — 6360000004 HC RX CONTRAST MEDICATION

## 2025-03-18 PROCEDURE — 83605 ASSAY OF LACTIC ACID: CPT

## 2025-03-18 PROCEDURE — 96366 THER/PROPH/DIAG IV INF ADDON: CPT

## 2025-03-18 PROCEDURE — 86140 C-REACTIVE PROTEIN: CPT

## 2025-03-18 PROCEDURE — 94760 N-INVAS EAR/PLS OXIMETRY 1: CPT

## 2025-03-18 PROCEDURE — 2060000000 HC ICU INTERMEDIATE R&B

## 2025-03-18 PROCEDURE — 74177 CT ABD & PELVIS W/CONTRAST: CPT

## 2025-03-18 PROCEDURE — 99285 EMERGENCY DEPT VISIT HI MDM: CPT

## 2025-03-18 PROCEDURE — 85652 RBC SED RATE AUTOMATED: CPT

## 2025-03-18 PROCEDURE — 36415 COLL VENOUS BLD VENIPUNCTURE: CPT

## 2025-03-18 PROCEDURE — 85025 COMPLETE CBC W/AUTO DIFF WBC: CPT

## 2025-03-18 PROCEDURE — 6360000002 HC RX W HCPCS: Performed by: INTERNAL MEDICINE

## 2025-03-18 PROCEDURE — 2580000003 HC RX 258: Performed by: INTERNAL MEDICINE

## 2025-03-18 PROCEDURE — 87635 SARS-COV-2 COVID-19 AMP PRB: CPT

## 2025-03-18 PROCEDURE — 84145 PROCALCITONIN (PCT): CPT

## 2025-03-18 PROCEDURE — 87502 INFLUENZA DNA AMP PROBE: CPT

## 2025-03-18 PROCEDURE — 93005 ELECTROCARDIOGRAM TRACING: CPT | Performed by: INTERNAL MEDICINE

## 2025-03-18 PROCEDURE — 81003 URINALYSIS AUTO W/O SCOPE: CPT

## 2025-03-18 PROCEDURE — 2500000003 HC RX 250 WO HCPCS: Performed by: INTERNAL MEDICINE

## 2025-03-18 PROCEDURE — 87506 IADNA-DNA/RNA PROBE TQ 6-11: CPT

## 2025-03-18 PROCEDURE — 83690 ASSAY OF LIPASE: CPT

## 2025-03-18 PROCEDURE — 80053 COMPREHEN METABOLIC PANEL: CPT

## 2025-03-18 PROCEDURE — 6360000002 HC RX W HCPCS

## 2025-03-18 PROCEDURE — 6370000000 HC RX 637 (ALT 250 FOR IP)

## 2025-03-18 PROCEDURE — 96375 TX/PRO/DX INJ NEW DRUG ADDON: CPT

## 2025-03-18 PROCEDURE — 2580000003 HC RX 258

## 2025-03-18 PROCEDURE — 87040 BLOOD CULTURE FOR BACTERIA: CPT

## 2025-03-18 PROCEDURE — 70450 CT HEAD/BRAIN W/O DYE: CPT

## 2025-03-18 PROCEDURE — 96367 TX/PROPH/DG ADDL SEQ IV INF: CPT

## 2025-03-18 RX ORDER — ACETAMINOPHEN 650 MG/1
650 SUPPOSITORY RECTAL EVERY 6 HOURS PRN
Status: DISCONTINUED | OUTPATIENT
Start: 2025-03-18 | End: 2025-03-21 | Stop reason: HOSPADM

## 2025-03-18 RX ORDER — SODIUM CHLORIDE 0.9 % (FLUSH) 0.9 %
5-40 SYRINGE (ML) INJECTION PRN
Status: DISCONTINUED | OUTPATIENT
Start: 2025-03-18 | End: 2025-03-21 | Stop reason: HOSPADM

## 2025-03-18 RX ORDER — ONDANSETRON 4 MG/1
4 TABLET, FILM COATED ORAL EVERY 8 HOURS PRN
COMMUNITY

## 2025-03-18 RX ORDER — POTASSIUM CHLORIDE 1500 MG/1
40 TABLET, EXTENDED RELEASE ORAL PRN
Status: DISCONTINUED | OUTPATIENT
Start: 2025-03-18 | End: 2025-03-21 | Stop reason: HOSPADM

## 2025-03-18 RX ORDER — METHOCARBAMOL 500 MG/1
1500 TABLET, FILM COATED ORAL ONCE
Status: COMPLETED | OUTPATIENT
Start: 2025-03-18 | End: 2025-03-18

## 2025-03-18 RX ORDER — OXYCODONE HYDROCHLORIDE 5 MG/1
5 TABLET ORAL EVERY 6 HOURS PRN
Status: DISCONTINUED | OUTPATIENT
Start: 2025-03-18 | End: 2025-03-21 | Stop reason: HOSPADM

## 2025-03-18 RX ORDER — PROCHLORPERAZINE EDISYLATE 5 MG/ML
10 INJECTION INTRAMUSCULAR; INTRAVENOUS ONCE
Status: COMPLETED | OUTPATIENT
Start: 2025-03-18 | End: 2025-03-18

## 2025-03-18 RX ORDER — DICYCLOMINE HCL 20 MG
20 TABLET ORAL 4 TIMES DAILY PRN
COMMUNITY

## 2025-03-18 RX ORDER — DICYCLOMINE HCL 20 MG
20 TABLET ORAL 4 TIMES DAILY PRN
Status: DISCONTINUED | OUTPATIENT
Start: 2025-03-18 | End: 2025-03-21 | Stop reason: HOSPADM

## 2025-03-18 RX ORDER — ACETAMINOPHEN 325 MG/1
650 TABLET ORAL EVERY 6 HOURS PRN
Status: DISCONTINUED | OUTPATIENT
Start: 2025-03-18 | End: 2025-03-21 | Stop reason: HOSPADM

## 2025-03-18 RX ORDER — POLYETHYLENE GLYCOL 3350 17 G/17G
17 POWDER, FOR SOLUTION ORAL DAILY PRN
Status: DISCONTINUED | OUTPATIENT
Start: 2025-03-18 | End: 2025-03-21 | Stop reason: HOSPADM

## 2025-03-18 RX ORDER — IOPAMIDOL 755 MG/ML
75 INJECTION, SOLUTION INTRAVASCULAR
Status: COMPLETED | OUTPATIENT
Start: 2025-03-18 | End: 2025-03-18

## 2025-03-18 RX ORDER — OXYCODONE HYDROCHLORIDE 5 MG/1
5 TABLET ORAL EVERY 6 HOURS PRN
COMMUNITY

## 2025-03-18 RX ORDER — ENOXAPARIN SODIUM 100 MG/ML
30 INJECTION SUBCUTANEOUS 2 TIMES DAILY
Status: DISCONTINUED | OUTPATIENT
Start: 2025-03-18 | End: 2025-03-21 | Stop reason: HOSPADM

## 2025-03-18 RX ORDER — SODIUM CHLORIDE 0.9 % (FLUSH) 0.9 %
5-40 SYRINGE (ML) INJECTION EVERY 12 HOURS SCHEDULED
Status: DISCONTINUED | OUTPATIENT
Start: 2025-03-18 | End: 2025-03-21 | Stop reason: HOSPADM

## 2025-03-18 RX ORDER — POTASSIUM CHLORIDE 7.45 MG/ML
10 INJECTION INTRAVENOUS PRN
Status: DISCONTINUED | OUTPATIENT
Start: 2025-03-18 | End: 2025-03-21 | Stop reason: HOSPADM

## 2025-03-18 RX ORDER — VANCOMYCIN 2 G/400ML
2000 INJECTION, SOLUTION INTRAVENOUS ONCE
Status: COMPLETED | OUTPATIENT
Start: 2025-03-18 | End: 2025-03-18

## 2025-03-18 RX ORDER — ONDANSETRON 2 MG/ML
4 INJECTION INTRAMUSCULAR; INTRAVENOUS EVERY 6 HOURS PRN
Status: DISCONTINUED | OUTPATIENT
Start: 2025-03-18 | End: 2025-03-21 | Stop reason: HOSPADM

## 2025-03-18 RX ORDER — 0.9 % SODIUM CHLORIDE 0.9 %
1000 INTRAVENOUS SOLUTION INTRAVENOUS ONCE
Status: COMPLETED | OUTPATIENT
Start: 2025-03-18 | End: 2025-03-18

## 2025-03-18 RX ORDER — VANCOMYCIN 1.25 G/250ML
1750 INJECTION, SOLUTION INTRAVENOUS EVERY 12 HOURS
Status: DISCONTINUED | OUTPATIENT
Start: 2025-03-19 | End: 2025-03-21 | Stop reason: HOSPADM

## 2025-03-18 RX ORDER — ACETAMINOPHEN 325 MG/1
650 TABLET ORAL ONCE
Status: COMPLETED | OUTPATIENT
Start: 2025-03-18 | End: 2025-03-18

## 2025-03-18 RX ORDER — SODIUM CHLORIDE 9 MG/ML
INJECTION, SOLUTION INTRAVENOUS PRN
Status: DISCONTINUED | OUTPATIENT
Start: 2025-03-18 | End: 2025-03-21 | Stop reason: HOSPADM

## 2025-03-18 RX ORDER — ONDANSETRON 4 MG/1
4 TABLET, ORALLY DISINTEGRATING ORAL EVERY 8 HOURS PRN
Status: DISCONTINUED | OUTPATIENT
Start: 2025-03-18 | End: 2025-03-21 | Stop reason: HOSPADM

## 2025-03-18 RX ORDER — SODIUM CHLORIDE 9 MG/ML
INJECTION, SOLUTION INTRAVENOUS CONTINUOUS
Status: ACTIVE | OUTPATIENT
Start: 2025-03-18 | End: 2025-03-19

## 2025-03-18 RX ORDER — SODIUM CHLORIDE 9 MG/ML
30 INJECTION, SOLUTION INTRAVENOUS ONCE
Status: COMPLETED | OUTPATIENT
Start: 2025-03-18 | End: 2025-03-18

## 2025-03-18 RX ORDER — 0.9 % SODIUM CHLORIDE 0.9 %
1000 INTRAVENOUS SOLUTION INTRAVENOUS ONCE
Status: DISCONTINUED | OUTPATIENT
Start: 2025-03-18 | End: 2025-03-18

## 2025-03-18 RX ORDER — MAGNESIUM SULFATE IN WATER 40 MG/ML
2000 INJECTION, SOLUTION INTRAVENOUS PRN
Status: DISCONTINUED | OUTPATIENT
Start: 2025-03-18 | End: 2025-03-21 | Stop reason: HOSPADM

## 2025-03-18 RX ADMIN — ENOXAPARIN SODIUM 30 MG: 100 INJECTION SUBCUTANEOUS at 21:35

## 2025-03-18 RX ADMIN — SODIUM CHLORIDE 1000 ML: 0.9 INJECTION, SOLUTION INTRAVENOUS at 17:07

## 2025-03-18 RX ADMIN — VANCOMYCIN 2000 MG: 2 INJECTION, SOLUTION INTRAVENOUS at 17:54

## 2025-03-18 RX ADMIN — SODIUM CHLORIDE: 9 INJECTION, SOLUTION INTRAVENOUS at 21:33

## 2025-03-18 RX ADMIN — CEFEPIME 2000 MG: 2 INJECTION, POWDER, FOR SOLUTION INTRAVENOUS at 17:13

## 2025-03-18 RX ADMIN — SODIUM CHLORIDE 20.82 ML/KG/HR: 0.9 INJECTION, SOLUTION INTRAVENOUS at 17:56

## 2025-03-18 RX ADMIN — IOPAMIDOL 75 ML: 755 INJECTION, SOLUTION INTRAVENOUS at 17:46

## 2025-03-18 RX ADMIN — SODIUM CHLORIDE, PRESERVATIVE FREE 10 ML: 5 INJECTION INTRAVENOUS at 21:31

## 2025-03-18 RX ADMIN — PROCHLORPERAZINE EDISYLATE 10 MG: 5 INJECTION INTRAMUSCULAR; INTRAVENOUS at 17:05

## 2025-03-18 RX ADMIN — METHOCARBAMOL 1500 MG: 500 TABLET ORAL at 18:30

## 2025-03-18 RX ADMIN — ACETAMINOPHEN 650 MG: 325 TABLET ORAL at 17:52

## 2025-03-18 ASSESSMENT — PAIN DESCRIPTION - LOCATION
LOCATION: HEAD

## 2025-03-18 ASSESSMENT — PAIN SCALES - GENERAL
PAINLEVEL_OUTOF10: 8
PAINLEVEL_OUTOF10: 0
PAINLEVEL_OUTOF10: 5
PAINLEVEL_OUTOF10: 10

## 2025-03-18 ASSESSMENT — PAIN DESCRIPTION - ORIENTATION: ORIENTATION: ANTERIOR

## 2025-03-18 ASSESSMENT — PAIN - FUNCTIONAL ASSESSMENT
PAIN_FUNCTIONAL_ASSESSMENT: 0-10
PAIN_FUNCTIONAL_ASSESSMENT: ACTIVITIES ARE NOT PREVENTED

## 2025-03-18 NOTE — ED NOTES
PT expresses that \"I want to go home.\" Writing Rn explained to patient that he is not discharged at this time, but that it is within his right to sign out AMA.     MLP was notified.

## 2025-03-18 NOTE — ED PROVIDER NOTES
[facility] CARE  EMERGENCY DEPARTMENT ENCOUNTER        Pt Name: Rich Zambrano  MRN: [id number]  Birthdate 1985  Date of evaluation: 3/18/2025  Provider: Laura James PA-C  PCP: Nina Titus MD  Note Started: 4:29 PM EDT 3/18/25      MICHELLE. I have evaluated this patient.        CHIEF COMPLAINT       Chief Complaint   Patient presents with    Abdominal Pain    Headache       HISTORY OF PRESENT ILLNESS: 1 or more Elements     History From: Patient      Rich Zambrano is a 39 y.o. male with a history of Crohn's disease who presents to the ED with a concern of epigastric abdominal pain and a headache that started this morning  Patient denies worst headache of his life, no thunderclap like headache, no dizziness, blurry vision, gait abnormalities, speech changes  Endorses nausea nonbloody diarrhea this morning and chills  Denies chest pain, shortness of breath,  Recent traveling, hospitalizations    Nursing Notes were all reviewed and agreed with or any disagreements were addressed in the HPI.    REVIEW OF SYSTEMS :      Review of Systems    Positives and Pertinent negatives as per HPI.     SURGICAL HISTORY   No past surgical history on file.    CURRENTMEDICATIONS       Current Discharge Medication List        CONTINUE these medications which have NOT CHANGED    Details   dicyclomine (BENTYL) 20 MG tablet Take 1 tablet by mouth 4 times daily as needed      ondansetron (ZOFRAN) 4 MG tablet Take 1 tablet by mouth every 8 hours as needed for Nausea or Vomiting      oxyCODONE (ROXICODONE) 5 MG immediate release tablet Take 1 tablet by mouth every 6 hours as needed for Pain. Max Daily Amount: 20 mg      gabapentin (NEURONTIN) 300 MG capsule Take 1 capsule by mouth 4 times daily for 240 doses.  Qty: 120 capsule, Refills: 1      inFLIXimab (REMICADE) 100 MG injection Infuse 5 mg/kg intravenously See Admin Instructions Per GI  Qty: 1 each, Refills: 4    Associated Diagnoses: Crohn's disease without  Pulmonary effort is normal. No respiratory distress.      Breath sounds: Normal breath sounds. No stridor. No wheezing or rhonchi.   Abdominal:      General: Bowel sounds are normal. There is no distension.      Tenderness: There is generalized abdominal tenderness. There is no right CVA tenderness or left CVA tenderness.      Hernia: No hernia is present.   Musculoskeletal:      Cervical back: Normal range of motion.   Skin:     General: Skin is dry.      Capillary Refill: Capillary refill takes less than 2 seconds.      Coloration: Skin is not pale.   Neurological:      General: No focal deficit present.      Mental Status: He is alert and oriented to person, place, and time.      GCS: GCS eye subscore is 4. GCS verbal subscore is 5. GCS motor subscore is 6.      Cranial Nerves: No cranial nerve deficit, dysarthria or facial asymmetry.      Sensory: Sensation is intact. No sensory deficit.      Motor: No weakness, tremor or atrophy.      Coordination: Romberg sign negative. Finger-Nose-Finger Test and Heel to Shin Test normal.      Comments: NIHSS 0  Test of skew negative   Psychiatric:         Mood and Affect: Mood normal.         Behavior: Behavior normal.         DIAGNOSTIC RESULTS   LABS:    Labs Reviewed   CBC WITH AUTO DIFFERENTIAL - Abnormal; Notable for the following components:       Result Value    WBC 3.4 (*)     Lymphocytes Absolute 0.2 (*)     All other components within normal limits   COMPREHENSIVE METABOLIC PANEL W/ REFLEX TO MG FOR LOW K - Abnormal; Notable for the following components:    Glucose 108 (*)     All other components within normal limits   LACTIC ACID - Abnormal; Notable for the following components:    Lactic Acid 2.2 (*)     All other components within normal limits   SEDIMENTATION RATE - Abnormal; Notable for the following components:    Sed Rate, Automated 30 (*)     All other components within normal limits   C-REACTIVE PROTEIN - Abnormal; Notable for the following components:

## 2025-03-18 NOTE — ED TRIAGE NOTES
Pt presents to ED by true for several complaints. Hx of chromes. States he began having a chromes flare up on Friday. C/o epigastric pain and nausea w/o vomiting. Does state he has had some diarrhea w/o blood. Pt states he called EMS today because he has had a persistent headache since about 0330 this morning. Pt also states he had the chills earlier today results in shaking. Says chills resolved after he took a shower.

## 2025-03-19 LAB
ANION GAP SERPL CALCULATED.3IONS-SCNC: 12 MMOL/L (ref 3–16)
BASOPHILS # BLD: 0 K/UL (ref 0–0.2)
BASOPHILS NFR BLD: 0 %
BUN SERPL-MCNC: 6 MG/DL (ref 7–20)
C DIFF TOX A+B STL QL IA: NORMAL
CALCIUM SERPL-MCNC: 8.4 MG/DL (ref 8.3–10.6)
CHLORIDE SERPL-SCNC: 106 MMOL/L (ref 99–110)
CO2 SERPL-SCNC: 22 MMOL/L (ref 21–32)
CREAT SERPL-MCNC: 1.1 MG/DL (ref 0.9–1.3)
DEPRECATED RDW RBC AUTO: 13.4 % (ref 12.4–15.4)
EKG ATRIAL RATE: 119 BPM
EKG DIAGNOSIS: NORMAL
EKG P AXIS: 23 DEGREES
EKG P-R INTERVAL: 150 MS
EKG Q-T INTERVAL: 324 MS
EKG QRS DURATION: 82 MS
EKG QTC CALCULATION (BAZETT): 455 MS
EKG R AXIS: 18 DEGREES
EKG T AXIS: -5 DEGREES
EKG VENTRICULAR RATE: 119 BPM
EOSINOPHIL # BLD: 0 K/UL (ref 0–0.6)
EOSINOPHIL NFR BLD: 0 %
GFR SERPLBLD CREATININE-BSD FMLA CKD-EPI: 87 ML/MIN/{1.73_M2}
GI PATHOGENS PNL STL NAA+PROBE: NORMAL
GLUCOSE SERPL-MCNC: 114 MG/DL (ref 70–99)
HCT VFR BLD AUTO: 39.5 % (ref 40.5–52.5)
HGB BLD-MCNC: 13.3 G/DL (ref 13.5–17.5)
LACTOFERRIN STL QL IA: ABNORMAL
LYMPHOCYTES # BLD: 0.5 K/UL (ref 1–5.1)
LYMPHOCYTES NFR BLD: 3 %
MAGNESIUM SERPL-MCNC: 1.6 MG/DL (ref 1.8–2.4)
MAGNESIUM SERPL-MCNC: 2.34 MG/DL (ref 1.8–2.4)
MCH RBC QN AUTO: 29.7 PG (ref 26–34)
MCHC RBC AUTO-ENTMCNC: 33.6 G/DL (ref 31–36)
MCV RBC AUTO: 88.4 FL (ref 80–100)
MONOCYTES # BLD: 0.4 K/UL (ref 0–1.3)
MONOCYTES NFR BLD: 2 %
MRSA DNA SPEC QL NAA+PROBE: NORMAL
NEUTROPHILS # BLD: 17.2 K/UL (ref 1.7–7.7)
NEUTROPHILS NFR BLD: 64 %
NEUTS BAND NFR BLD MANUAL: 31 % (ref 0–7)
NEUTS VAC BLD QL SMEAR: PRESENT
PLATELET # BLD AUTO: 285 K/UL (ref 135–450)
PLATELET BLD QL SMEAR: ADEQUATE
PMV BLD AUTO: 7.8 FL (ref 5–10.5)
POTASSIUM SERPL-SCNC: 3.2 MMOL/L (ref 3.5–5.1)
RBC # BLD AUTO: 4.47 M/UL (ref 4.2–5.9)
RBC MORPH BLD: NORMAL
SLIDE REVIEW: ABNORMAL
SODIUM SERPL-SCNC: 140 MMOL/L (ref 136–145)
WBC # BLD AUTO: 18.1 K/UL (ref 4–11)

## 2025-03-19 PROCEDURE — 83993 ASSAY FOR CALPROTECTIN FECAL: CPT

## 2025-03-19 PROCEDURE — 87449 NOS EACH ORGANISM AG IA: CPT

## 2025-03-19 PROCEDURE — 87324 CLOSTRIDIUM AG IA: CPT

## 2025-03-19 PROCEDURE — 6360000002 HC RX W HCPCS

## 2025-03-19 PROCEDURE — 94660 CPAP INITIATION&MGMT: CPT

## 2025-03-19 PROCEDURE — 87641 MR-STAPH DNA AMP PROBE: CPT

## 2025-03-19 PROCEDURE — 83735 ASSAY OF MAGNESIUM: CPT

## 2025-03-19 PROCEDURE — 85025 COMPLETE CBC W/AUTO DIFF WBC: CPT

## 2025-03-19 PROCEDURE — 6360000002 HC RX W HCPCS: Performed by: INTERNAL MEDICINE

## 2025-03-19 PROCEDURE — 83630 LACTOFERRIN FECAL (QUAL): CPT

## 2025-03-19 PROCEDURE — 2500000003 HC RX 250 WO HCPCS: Performed by: INTERNAL MEDICINE

## 2025-03-19 PROCEDURE — 6370000000 HC RX 637 (ALT 250 FOR IP): Performed by: INTERNAL MEDICINE

## 2025-03-19 PROCEDURE — 2060000000 HC ICU INTERMEDIATE R&B

## 2025-03-19 PROCEDURE — 2580000003 HC RX 258: Performed by: INTERNAL MEDICINE

## 2025-03-19 PROCEDURE — 80048 BASIC METABOLIC PNL TOTAL CA: CPT

## 2025-03-19 PROCEDURE — 93010 ELECTROCARDIOGRAM REPORT: CPT | Performed by: INTERNAL MEDICINE

## 2025-03-19 PROCEDURE — 36415 COLL VENOUS BLD VENIPUNCTURE: CPT

## 2025-03-19 RX ORDER — METHYLPREDNISOLONE SODIUM SUCCINATE 40 MG/ML
20 INJECTION INTRAMUSCULAR; INTRAVENOUS EVERY 8 HOURS
Status: DISCONTINUED | OUTPATIENT
Start: 2025-03-19 | End: 2025-03-21

## 2025-03-19 RX ADMIN — CEFEPIME 2000 MG: 2 INJECTION, POWDER, FOR SOLUTION INTRAVENOUS at 02:13

## 2025-03-19 RX ADMIN — CEFEPIME 2000 MG: 2 INJECTION, POWDER, FOR SOLUTION INTRAVENOUS at 09:44

## 2025-03-19 RX ADMIN — ENOXAPARIN SODIUM 30 MG: 100 INJECTION SUBCUTANEOUS at 20:10

## 2025-03-19 RX ADMIN — VANCOMYCIN 1750 MG: 1.25 INJECTION, SOLUTION INTRAVENOUS at 06:39

## 2025-03-19 RX ADMIN — POTASSIUM CHLORIDE 40 MEQ: 1500 TABLET, EXTENDED RELEASE ORAL at 05:08

## 2025-03-19 RX ADMIN — SODIUM CHLORIDE, PRESERVATIVE FREE 10 ML: 5 INJECTION INTRAVENOUS at 09:44

## 2025-03-19 RX ADMIN — ACETAMINOPHEN 650 MG: 325 TABLET ORAL at 20:09

## 2025-03-19 RX ADMIN — SODIUM CHLORIDE: 0.9 INJECTION, SOLUTION INTRAVENOUS at 02:13

## 2025-03-19 RX ADMIN — MAGNESIUM SULFATE HEPTAHYDRATE 2000 MG: 40 INJECTION, SOLUTION INTRAVENOUS at 06:42

## 2025-03-19 RX ADMIN — VANCOMYCIN 1750 MG: 1.25 INJECTION, SOLUTION INTRAVENOUS at 17:59

## 2025-03-19 RX ADMIN — METHYLPREDNISOLONE SODIUM SUCCINATE 20 MG: 40 INJECTION, POWDER, LYOPHILIZED, FOR SOLUTION INTRAMUSCULAR; INTRAVENOUS at 20:09

## 2025-03-19 RX ADMIN — ACETAMINOPHEN 650 MG: 325 TABLET ORAL at 09:39

## 2025-03-19 RX ADMIN — SODIUM CHLORIDE: 0.9 INJECTION, SOLUTION INTRAVENOUS at 06:39

## 2025-03-19 RX ADMIN — SODIUM CHLORIDE: 0.9 INJECTION, SOLUTION INTRAVENOUS at 06:41

## 2025-03-19 RX ADMIN — ENOXAPARIN SODIUM 30 MG: 100 INJECTION SUBCUTANEOUS at 09:44

## 2025-03-19 RX ADMIN — CEFEPIME 2000 MG: 2 INJECTION, POWDER, FOR SOLUTION INTRAVENOUS at 17:31

## 2025-03-19 RX ADMIN — SODIUM CHLORIDE, PRESERVATIVE FREE 10 ML: 5 INJECTION INTRAVENOUS at 20:11

## 2025-03-19 RX ADMIN — METHYLPREDNISOLONE SODIUM SUCCINATE 20 MG: 40 INJECTION, POWDER, LYOPHILIZED, FOR SOLUTION INTRAMUSCULAR; INTRAVENOUS at 11:46

## 2025-03-19 RX ADMIN — OXYCODONE 5 MG: 5 TABLET ORAL at 05:12

## 2025-03-19 RX ADMIN — ACETAMINOPHEN 650 MG: 325 TABLET ORAL at 14:21

## 2025-03-19 ASSESSMENT — PAIN DESCRIPTION - DESCRIPTORS
DESCRIPTORS: ACHING;DISCOMFORT;THROBBING
DESCRIPTORS: ACHING

## 2025-03-19 ASSESSMENT — PAIN SCALES - GENERAL
PAINLEVEL_OUTOF10: 5
PAINLEVEL_OUTOF10: 0
PAINLEVEL_OUTOF10: 10
PAINLEVEL_OUTOF10: 8
PAINLEVEL_OUTOF10: 0
PAINLEVEL_OUTOF10: 6
PAINLEVEL_OUTOF10: 8
PAINLEVEL_OUTOF10: 3

## 2025-03-19 ASSESSMENT — PAIN DESCRIPTION - LOCATION
LOCATION: HEAD

## 2025-03-19 ASSESSMENT — PAIN - FUNCTIONAL ASSESSMENT
PAIN_FUNCTIONAL_ASSESSMENT: ACTIVITIES ARE NOT PREVENTED
PAIN_FUNCTIONAL_ASSESSMENT: ACTIVITIES ARE NOT PREVENTED

## 2025-03-19 ASSESSMENT — PAIN DESCRIPTION - ORIENTATION
ORIENTATION: MID

## 2025-03-19 NOTE — CONSULTS
Clinical Pharmacy Note  Vancomycin Consult    Rich Zambrano is a 39 y.o. male ordered vancomycin for sepsis; consult received from Dr. Martino to manage therapy. Also receiving cefepime.    Allergies:  Patient has no known allergies.     Temp max:  Temp (24hrs), Av.3 °F (38.5 °C), Min:100.5 °F (38.1 °C), Max:102.1 °F (38.9 °C)      Recent Labs     25  1636   WBC 3.4*       Recent Labs     25  1636   BUN 8   CREATININE 0.9         Intake/Output Summary (Last 24 hours) at 3/18/2025 2107  Last data filed at 3/18/2025 2054  Gross per 24 hour   Intake 3767 ml   Output --   Net 3767 ml       Culture Results:  Pending    Ht Readings from Last 1 Encounters:   25 1.702 m (5' 7.01\")        Wt Readings from Last 1 Encounters:   25 108.9 kg (240 lb 1.3 oz)         Estimated Creatinine Clearance: 130 mL/min (based on SCr of 0.9 mg/dL).    Assessment/Plan:  Day # 1 of vancomycin.  Vancomycin 1750 mg IV every 12 hours.    Goal -600  Predicted AUC(24,ss): 513      Thank you for the consult.   Lian Crowe [facility], PharmD, 3/18/2025 9:08 PM

## 2025-03-19 NOTE — CONSULTS
GASTROENTEROLOGY INPATIENT CONSULTATION        IDENTIFYING DATA/REASON FOR CONSULTATION   PATIENT:  Rich Zambrano  MRN:  [id number]  ADMIT DATE: 3/18/2025  TIME OF EVALUATION: 3/19/2025 8:21 AM  HOSPITAL STAY:   LOS: 1 day     REASON FOR CONSULTATION:  hx of crohn disease    HISTORY OF PRESENT ILLNESS   Rich Zambrano is a 39 y.o. male with a PMH of crohn disease, sleep apnea who presented on 3/18/2025 with epigastric abdominal pain and headache. We have been consulted regarding crohn disease.  Patient reports on Friday started to have abdominal pain that was not improving which prompted his ED visit.  Endorse daily episodes of diarrhea with brown stool that started on Friday.  Denies fever nausea vomiting hematemesis hematochezia melena.  Yesterday the patient called his gastroenterologist office due to abdominal pain and was prescribed prednisone Bentyl and Zofran.  He was unable to  the prescriptions from the pharmacy.  Today reports abdominal pain has improved so far during admission.  Did have episode of loose stools earlier today.  Denies any blood.      Prior Endoscopic Evaluations:  Colonoscopy 6/23/2021 Migel Alas Jr., MD   Findings: Rectal examination revealed a tight anal stricture that would not admit the examining finger but did allow entry of the adult colonoscope with some difficulty. Advance the scope to the cecum. Cecum itself had an abnormal vascular pattern but entrance to the cecum appeared inflamed there was a 7 mm polyp that was removed with a cold snare. Entrance to the ileum was identified but the scope would not pass in due to narrowing. We did obtain biopsies presumably from the ileum by passing the biopsy forceps through the ileocecal valve. Extensive biopsies of the entrance to the ileum and ileocecal valve which all appeared inflamed.    There was an abnormal vascular pattern in the ascending colon biopsies obtained transverse colon and ascending colon appeared  studies      If you have any questions or need any further information, please feel free to contact our consult team.  Thank you for allowing us to participate in the care of Rich Zambrano.    The note was completed using Dragon voice recognition transcription. Every effort was made to ensure accuracy; however, inadvertent transcription errors may be present despite my best efforts to edit errors.      Karie Salas PA-C 3/19/2025 at 8:21 AM

## 2025-03-19 NOTE — PROGRESS NOTES
V2.0    [facility] [place]      Name:  Rich Zambrano /Age/Sex: 1985  (39 y.o. male)   MRN & CSN:  [id number] & [id number] Encounter Date/Time: 3/19/2025 3:55 PM EDT   Location:  [id number]-[id number]/5120-01 PCP: Nina Titus MD     Attending:Barbara Ochoa MD       Hospital Day: 2    Assessment and Recommendations   Rich Zambrano is a 39 y.o. male who presents with Sepsis (HCC)      Plan:   Acute crohns exacerbation  Diarrhea  - GI consulted  - started on solumedrol IV q8h  - CT abd reviewed  - on clear liquid diet  - check stool testes per GI  - cont IVF, check inflammatory markers    Hypokalemia/hypomag - replete    Lumbar disc herniation with radiculopathy  - cont home meds    ERIC - on CPAP      Diet ADULT DIET; Clear Liquid   DVT Prophylaxis [x] Lovenox, []  Heparin, [] SCDs, [] Ambulation,  [] Eliquis, [] Xarelto  [] Coumadin   Code Status Full Code             Personally reviewed Lab Studies and Imaging     Subjective:     Tolerating clear liquids, still with loose BM, abd pain improved    Review of Systems:      Pertinent positives and negatives discussed in HPI    Objective:     Intake/Output Summary (Last 24 hours) at 3/19/2025 1555  Last data filed at 3/19/2025 1500  Gross per 24 hour   Intake 4727 ml   Output --   Net 4727 ml      Vitals:   Vitals:    25 0542 25 0733 25 1145 25 1530   BP:  (!) 90/43 125/80 119/62   Pulse:  94 79 82   Resp: 18  17 16   Temp:  99.7 °F (37.6 °C) 98.8 °F (37.1 °C) 98.8 °F (37.1 °C)   TempSrc:  Oral Oral    SpO2:  97% 100% 94%   Weight:       Height:             Physical Exam:      General: Awake, oriented  HEENT: Pupils round, reacting to light  Heart: S1 S2 heard, no murmurs  Lung: Clear b/l  Abd: Soft, not distended, not tender, BS +  Extr: No cyanosis or clubbing  Neuro: No focal deficits.      Medications:   Medications:    methylPREDNISolone  20 mg IntraVENous q8h    sodium chloride flush  5-40 mL IntraVENous 2 times per day     Cultures: No results found for: \"LABURIN\"  Blood Cultures: No results found for: \"BC\"  No results found for: \"BLOODCULT2\"  Organism: No results found for: \"ORG\"      Electronically signed by Barbara Ochoa MD on 3/19/2025 at 3:55 PM

## 2025-03-19 NOTE — PROGRESS NOTES
Medication Reconciliation     List of medications patient is currently taking is complete.     Source of information: 1. Conversation with patient at bedside                                      2. EPIC records      Allergies  Patient has no known allergies.     Notes regarding home medications:  1. Patient states he took the morning doses of his home medications today prior to arrival in the ED  2. Was prescribed a prednisone taper today: 40 mg x 14 days, then 30 mg x 14 days, then 20 mg x 14 days, then 10 mg x 14 days. Pt has not yet started taking this.     Betty Lyles, Pharmacy Intern  3/18/2025 8:07 PM

## 2025-03-19 NOTE — CARE COORDINATION
Discharge Planning:      (CM) reviewed the patient's chart to assess needs. Patient's Readmission Risk Score is   . Patient's medical insurance is  Payor: ANNALISA / Plan: ANNALISA PATEL ISSA / Product Type: *No Product type* / .  Patient's PCP is Nina Titus MD .  No needs anticipated, at this time. CM team to follow. Staff to inform CM if additional discharge needs arise.    Pts preferred pharmacy is   [address] [facility] -  [phone] - F [phone] [address] [facility] [address]  Phone: [phone] Fax: [phone]    We are following for final IVAB recs. If there are interim needs we can address today please call.     Respectfully submitted,    Sachi Abreu-KLARISSA Orr  [facility]   [phone]    Electronically signed by JENELLE Feng, CANDY on 3/19/2025 at 8:53 AM

## 2025-03-19 NOTE — PROGRESS NOTES
Clinical Pharmacy Note  Renal Dose Adjustment    Rich Zambrano is receiving cefepime for sepsis.  This medication is renally eliminated.  Based on the patient's Estimated Creatinine Clearance: 130 mL/min (based on SCr of 0.9 mg/dL). and urine output, the dose has been adjusted to cefepime 2 g every 8 hours per protocol.    Pharmacy will continue to monitor and adjust dose as needed for changes in renal function.    Lian Crowe [facility], PharmD, 3/18/2025 9:13 PM

## 2025-03-19 NOTE — PROGRESS NOTES
Pt arrived to floor via stretcher from ED and ambulated to bed. Telemetry activated. Patient oriented to room and use of call light. Call light and personal items within reach. Admission and assessment initiated. POC and education initiated and reviewed with patient/family. Denied further needs or questions at this time.     Electronically signed by DUSTIN MENDOZA RN on 3/18/25 at 10:47 PM EDT

## 2025-03-19 NOTE — PLAN OF CARE
Problem: Discharge Planning  Goal: Discharge to home or other facility with appropriate resources  3/19/2025 1222 by Lilliana Loya RN  Outcome: Progressing  3/18/2025 2306 by Anayeli Aden RN  Outcome: Progressing     Problem: Pain  Goal: Verbalizes/displays adequate comfort level or baseline comfort level  3/19/2025 1222 by Lilliana Loya RN  Outcome: Progressing  3/18/2025 2306 by Anayeli Aden RN  Outcome: Progressing     Problem: ABCDS Injury Assessment  Goal: Absence of physical injury  3/19/2025 1222 by Lilliana Loya RN  Outcome: Progressing  3/18/2025 2306 by Anayeli Aden RN  Outcome: Progressing

## 2025-03-19 NOTE — H&P
adjustment of the mA/kV was utilized to reduce the radiation dose to as low as reasonably achievable. COMPARISON: None. HISTORY: ORDERING SYSTEM PROVIDED HISTORY: umbilical abdominal pain TECHNOLOGIST PROVIDED HISTORY: Reason for exam:->umbilical abdominal pain Additional Contrast?->None Decision Support Exception - unselect if not a suspected or confirmed emergency medical condition->Emergency Medical Condition (MA) Reason for Exam: umbilical abdominal pain Relevant Medical/Surgical History: none FINDINGS: Lower Chest: No acute airspace infiltrate or pleural effusion.  4 mm pulmonary nodule in the right lung base.  Per Fleischner society guidelines no routine follow-up imaging is recommended.  No acute infiltrate or pleural effusion Organs: The liver, gallbladder, spleen, pancreas, adrenal glands, and kidneys are normal. GI/Bowel: Distal esophagus and stomach are normal.  Duodenum is normal.  The other small bowel loops show no evidence of obstruction or inflammation. There is wall thickening and inflammatory change involving the cecum.  There is also mild inflammatory change surrounding the terminal ileum.  The appendix is seen arising from the cecum.  The appendix is normal diameter and shows no definite surrounding inflammatory change. Pelvis: The urinary bladder is normal.  The prostate is normal.  No free fluid. Peritoneum/Retroperitoneum: The aorta is normal caliber.  No lymphadenopathy. Bones/Soft Tissues: No suspicious osseous lesions     1.  Wall thickening and inflammatory change involving the cecum and terminal ileum consistent with inflammatory or infectious colitis/enteritis.  Normal appendix.     CT HEAD WO CONTRAST  Result Date: 3/18/2025  EXAMINATION: CT OF THE HEAD WITHOUT CONTRAST  3/18/2025 4:21 pm TECHNIQUE: CT of the head was performed without the administration of intravenous contrast. Automated exposure control, iterative reconstruction, and/or weight based adjustment of the mA/kV was  concerns please feel free to contact the dictating provider for clarification.

## 2025-03-19 NOTE — PROGRESS NOTES
4 Eyes Skin Assessment     NAME:  Rich Zambrano  YOB: 1985  MEDICAL RECORD NUMBER:  [id number]    The patient is being assessed for  Admission    I agree that at least one RN has performed a thorough Head to Toe Skin Assessment on the patient. ALL assessment sites listed below have been assessed.      Areas assessed by both nurses:    Head, Face, Ears, Shoulders, Back, Chest, Arms, Elbows, Hands, Sacrum. Buttock, Coccyx, Ischium, Legs. Feet and Heels, and Under Medical Devices         Does the Patient have a Wound? No noted wound(s)       Yg Prevention initiated by RN: No  Wound Care Orders initiated by RN: No    Pressure Injury (Stage 3,4, Unstageable, DTI, NWPT, and Complex wounds) if present, place Wound referral order by RN under : No    New Ostomies, if present place, Ostomy referral order under : No     Nurse 1 eSignature: Electronically signed by DUSTIN MENDOZA RN on 3/18/25 at 10:46 PM EDT    **SHARE this note so that the co-signing nurse can place an eSignature**    Nurse 2 eSignature: Electronically signed by Leonora Hill RN on 3/18/25 at 10:47 PM EDT

## 2025-03-20 LAB
ANION GAP SERPL CALCULATED.3IONS-SCNC: 11 MMOL/L (ref 3–16)
BUN SERPL-MCNC: 7 MG/DL (ref 7–20)
CALCIUM SERPL-MCNC: 8.9 MG/DL (ref 8.3–10.6)
CHLORIDE SERPL-SCNC: 105 MMOL/L (ref 99–110)
CO2 SERPL-SCNC: 20 MMOL/L (ref 21–32)
CREAT SERPL-MCNC: 0.8 MG/DL (ref 0.9–1.3)
GFR SERPLBLD CREATININE-BSD FMLA CKD-EPI: >90 ML/MIN/{1.73_M2}
GLUCOSE SERPL-MCNC: 130 MG/DL (ref 70–99)
POTASSIUM SERPL-SCNC: 3.6 MMOL/L (ref 3.5–5.1)
SODIUM SERPL-SCNC: 136 MMOL/L (ref 136–145)
VANCOMYCIN SERPL-MCNC: 11.2 UG/ML

## 2025-03-20 PROCEDURE — 6360000002 HC RX W HCPCS

## 2025-03-20 PROCEDURE — 94760 N-INVAS EAR/PLS OXIMETRY 1: CPT

## 2025-03-20 PROCEDURE — 2060000000 HC ICU INTERMEDIATE R&B

## 2025-03-20 PROCEDURE — 94660 CPAP INITIATION&MGMT: CPT

## 2025-03-20 PROCEDURE — 6370000000 HC RX 637 (ALT 250 FOR IP): Performed by: INTERNAL MEDICINE

## 2025-03-20 PROCEDURE — 80202 ASSAY OF VANCOMYCIN: CPT

## 2025-03-20 PROCEDURE — 6360000002 HC RX W HCPCS: Performed by: INTERNAL MEDICINE

## 2025-03-20 PROCEDURE — 2580000003 HC RX 258: Performed by: INTERNAL MEDICINE

## 2025-03-20 PROCEDURE — 2500000003 HC RX 250 WO HCPCS: Performed by: INTERNAL MEDICINE

## 2025-03-20 PROCEDURE — 36415 COLL VENOUS BLD VENIPUNCTURE: CPT

## 2025-03-20 PROCEDURE — 2500000003 HC RX 250 WO HCPCS

## 2025-03-20 PROCEDURE — 80048 BASIC METABOLIC PNL TOTAL CA: CPT

## 2025-03-20 RX ORDER — WATER 10 ML/10ML
INJECTION INTRAMUSCULAR; INTRAVENOUS; SUBCUTANEOUS
Status: COMPLETED
Start: 2025-03-20 | End: 2025-03-20

## 2025-03-20 RX ADMIN — CEFEPIME 2000 MG: 2 INJECTION, POWDER, FOR SOLUTION INTRAVENOUS at 16:31

## 2025-03-20 RX ADMIN — CEFEPIME 2000 MG: 2 INJECTION, POWDER, FOR SOLUTION INTRAVENOUS at 00:41

## 2025-03-20 RX ADMIN — SODIUM CHLORIDE 25 ML/HR: 0.9 INJECTION, SOLUTION INTRAVENOUS at 16:26

## 2025-03-20 RX ADMIN — METHYLPREDNISOLONE SODIUM SUCCINATE 20 MG: 40 INJECTION, POWDER, LYOPHILIZED, FOR SOLUTION INTRAMUSCULAR; INTRAVENOUS at 04:54

## 2025-03-20 RX ADMIN — VANCOMYCIN 1750 MG: 1.25 INJECTION, SOLUTION INTRAVENOUS at 17:57

## 2025-03-20 RX ADMIN — VANCOMYCIN 1750 MG: 1.25 INJECTION, SOLUTION INTRAVENOUS at 05:38

## 2025-03-20 RX ADMIN — WATER 1 ML: 1 INJECTION INTRAMUSCULAR; INTRAVENOUS; SUBCUTANEOUS at 11:59

## 2025-03-20 RX ADMIN — METHYLPREDNISOLONE SODIUM SUCCINATE 20 MG: 40 INJECTION, POWDER, LYOPHILIZED, FOR SOLUTION INTRAMUSCULAR; INTRAVENOUS at 11:58

## 2025-03-20 RX ADMIN — METHYLPREDNISOLONE SODIUM SUCCINATE 20 MG: 40 INJECTION, POWDER, LYOPHILIZED, FOR SOLUTION INTRAMUSCULAR; INTRAVENOUS at 20:39

## 2025-03-20 RX ADMIN — WATER 0.5 ML: 1 INJECTION INTRAMUSCULAR; INTRAVENOUS; SUBCUTANEOUS at 04:56

## 2025-03-20 RX ADMIN — ENOXAPARIN SODIUM 30 MG: 100 INJECTION SUBCUTANEOUS at 09:46

## 2025-03-20 RX ADMIN — OXYCODONE 5 MG: 5 TABLET ORAL at 05:39

## 2025-03-20 RX ADMIN — ENOXAPARIN SODIUM 30 MG: 100 INJECTION SUBCUTANEOUS at 20:39

## 2025-03-20 RX ADMIN — SODIUM CHLORIDE, PRESERVATIVE FREE 10 ML: 5 INJECTION INTRAVENOUS at 20:45

## 2025-03-20 RX ADMIN — CEFEPIME 2000 MG: 2 INJECTION, POWDER, FOR SOLUTION INTRAVENOUS at 09:48

## 2025-03-20 ASSESSMENT — PAIN - FUNCTIONAL ASSESSMENT: PAIN_FUNCTIONAL_ASSESSMENT: ACTIVITIES ARE NOT PREVENTED

## 2025-03-20 ASSESSMENT — PAIN DESCRIPTION - LOCATION: LOCATION: ABDOMEN;HEAD

## 2025-03-20 ASSESSMENT — PAIN SCALES - GENERAL: PAINLEVEL_OUTOF10: 6

## 2025-03-20 ASSESSMENT — PAIN DESCRIPTION - DESCRIPTORS: DESCRIPTORS: ACHING;DISCOMFORT;THROBBING

## 2025-03-20 ASSESSMENT — PAIN DESCRIPTION - ORIENTATION: ORIENTATION: MID

## 2025-03-20 NOTE — PLAN OF CARE
Problem: Discharge Planning  Goal: Discharge to home or other facility with appropriate resources  3/20/2025 1544 by Ella Liang RN  Outcome: Progressing  Note: Prior to admission pt was living at home with his wife and independent in his care. Plan is to return to that environment.     Problem: Pain  Goal: Verbalizes/displays adequate comfort level or baseline comfort level  3/20/2025 1544 by Ella Liang RN  Outcome: Progressing  Note: Pain/discomfort being managed with PRN analgesics per MD orders. Pt able to express presence and absence of pain and rate pain appropriately using numerical scale.      Problem: ABCDS Injury Assessment  Goal: Absence of physical injury  Outcome: Progressing  Note: No signs of physical injury.     Problem: Gastrointestinal - Adult  Goal: Minimal or absence of nausea and vomiting  Outcome: Progressing  Note: No complaints of nausea this shift and is tolerating prescribed diet.     Problem: Gastrointestinal - Adult  Goal: Maintains or returns to baseline bowel function  Outcome: Progressing  Note: Pt did have a bowel movement this shift. Abdomen soft, bowel sounds positive no tenderness or guarding.     Problem: Gastrointestinal - Adult  Goal: Maintains adequate nutritional intake  Outcome: Progressing  Note: Pt is tolerating regular diet. He has no complaints of Nausea after eating a regular lunch. Pt did well with liquid breakfast.

## 2025-03-20 NOTE — PLAN OF CARE
Problem: Discharge Planning  Goal: Discharge to home or other facility with appropriate resources  Outcome: Progressing  Flowsheets (Taken 3/19/2025 2009)  Discharge to home or other facility with appropriate resources:   Identify barriers to discharge with patient and caregiver   Identify discharge learning needs (meds, wound care, etc)     Problem: Pain  Goal: Verbalizes/displays adequate comfort level or baseline comfort level  Outcome: Progressing  Flowsheets (Taken 3/19/2025 1530 by Yadira Dumont RN)  Verbalizes/displays adequate comfort level or baseline comfort level:   Assess pain using appropriate pain scale   Encourage patient to monitor pain and request assistance   Implement non-pharmacological measures as appropriate and evaluate response   Administer analgesics based on type and severity of pain and evaluate response

## 2025-03-20 NOTE — PROGRESS NOTES
Pt is awake alert and oriented able to make needs known. His wife was in earlier visiting. Pt offers no complaints of pain. Pt is aware that his diet was advanced from clear liquids to regular. Did suggest to pt that he go easy when he first starts eating. He should avoid spicy foods or foods that are triggers for him. Pt verbalized understanding. Assessment completed and charted. Call light within reach. Will monitor.

## 2025-03-20 NOTE — PROGRESS NOTES
V2.0    [facility] Progress Note      Name:  Rich Zambrano /Age/Sex: 1985  (39 y.o. male)   MRN & CSN:  [id number] & [id number] Encounter Date/Time: 3/20/2025 2:08 PM EDT   Location:  [id number] PCP: Nina Titus MD     Attending:Barbara Ochoa MD       Hospital Day: 3    Assessment and Recommendations   Rich Zambrano is a 39 y.o. male who presents with Sepsis (HCC)      Plan:     Acute crohns exacerbation  Diarrhea  - GI consulted  - started on solumedrol IV q8h  - CT abd reviewed  - on clear liquid diet-->general diet  - check stool tests per GI  - cont IVF, reviewed inflammatory markers  - dc IVF     Hypokalemia/hypomag - repleted     Lumbar disc herniation with radiculopathy  - cont home meds     ERIC - on CPAP      Diet ADULT DIET; Regular   DVT Prophylaxis [x] Lovenox, []  Heparin, [] SCDs, [] Ambulation,  [] Eliquis, [] Xarelto  [] Coumadin   Code Status Full Code             Personally reviewed Lab Studies and Imaging       Subjective:     States diarrhea improved, tolerating regular diet    Review of Systems:      Pertinent positives and negatives discussed in HPI    Objective:     Intake/Output Summary (Last 24 hours) at 3/20/2025 1408  Last data filed at 3/20/2025 0951  Gross per 24 hour   Intake 1480 ml   Output --   Net 1480 ml      Vitals:   Vitals:    25 0539 25 0657 25 0857 25 1130   BP:  112/79  118/82   Pulse:   84    Resp: 16 18  16   Temp:  98.1 °F (36.7 °C)  98.5 °F (36.9 °C)   TempSrc:  Oral  Oral   SpO2:  97% 97% 97%   Weight:       Height:             Physical Exam:      General: Awake, oriented  HEENT: Pupils round, reacting to light  Heart: S1 S2 heard, no murmurs  Lung: Clear b/l  Abd: Soft, not distended, not tender, BS +  Extr: No cyanosis or clubbing  Neuro: No focal deficits.      Medications:   Medications:    methylPREDNISolone  20 mg IntraVENous q8h    sodium chloride flush  5-40 mL IntraVENous 2 times per day    enoxaparin  30 mg

## 2025-03-20 NOTE — PROGRESS NOTES
Progress Note    Patient Rich Zambrano  MRN: [id number]  YOB: 1985 Age: 39 y.o. Sex: male  Room: [place] [address]       Admitting Physician: Rajesh Martino MD   Date of Admission: 3/18/2025  3:58 PM   Primary Care Physician: Nina Titus MD     Subjective:  Rich Zambrano was seen and examined. We are following for flare of Crohn's disease.  -- Patient stated that he is doing significantly better.  He had 2 loose bowel movements overnight.  Denies noticing blood in stools.  Denies abdominal pain.  He is tolerating a regular diet.  He is ambulating in the room.      Objective:  Vital Signs:   Vitals:    03/20/25 1130   BP: 118/82   Pulse:    Resp: 16   Temp: 98.5 °F (36.9 °C)   SpO2: 97%         Physical Exam:  Constitutional: Alert and oriented x 4. No acute distress.   Intake/Output:    Intake/Output Summary (Last 24 hours) at 3/20/2025 1303  Last data filed at 3/20/2025 0951  Gross per 24 hour   Intake 1480 ml   Output --   Net 1480 ml        Current Medications:  Current Facility-Administered Medications   Medication Dose Route Frequency Provider Last Rate Last Admin    methylPREDNISolone sodium succ (SOLU-MEDROL) injection 20 mg  20 mg IntraVENous q8h Salas, Karie KIM PA-C   20 mg at 03/20/25 1158    sodium chloride flush 0.9 % injection 5-40 mL  5-40 mL IntraVENous 2 times per day Rajesh Martino MD   10 mL at 03/19/25 2011    sodium chloride flush 0.9 % injection 5-40 mL  5-40 mL IntraVENous PRN Rajesh Martino MD        0.9 % sodium chloride infusion   IntraVENous PRN Rajesh Martino MD 5 mL/hr at 03/19/25 0641 New Bag at 03/19/25 0641    potassium chloride (KLOR-CON M) extended release tablet 40 mEq  40 mEq Oral PRN Rajesh Martino MD   40 mEq at 03/19/25 0508    Or    potassium bicarb-citric acid (EFFER-K) effervescent tablet 40 mEq  40 mEq Oral PRN Rajesh Martino, MD        Or    potassium chloride 10 mEq/100 mL IVPB (Peripheral Line)  10 mEq  achievable.    COMPARISON:  None.    HISTORY:  ORDERING SYSTEM PROVIDED HISTORY: umbilical abdominal pain  TECHNOLOGIST PROVIDED HISTORY:  Reason for exam:->umbilical abdominal pain  Additional Contrast?->None  Decision Support Exception - unselect if not a suspected or confirmed  emergency medical condition->Emergency Medical Condition (MA)  Reason for Exam: umbilical abdominal pain  Relevant Medical/Surgical History: none    FINDINGS:  Lower Chest: No acute airspace infiltrate or pleural effusion.  4 mm  pulmonary nodule in the right lung base.  Per Fleischner society guidelines  no routine follow-up imaging is recommended.  No acute infiltrate or pleural  effusion    Organs: The liver, gallbladder, spleen, pancreas, adrenal glands, and kidneys  are normal.    GI/Bowel: Distal esophagus and stomach are normal.  Duodenum is normal.  The  other small bowel loops show no evidence of obstruction or inflammation.  There is wall thickening and inflammatory change involving the cecum.  There  is also mild inflammatory change surrounding the terminal ileum.  The  appendix is seen arising from the cecum.  The appendix is normal diameter and  shows no definite surrounding inflammatory change.    Pelvis: The urinary bladder is normal.  The prostate is normal.  No free  fluid.    Peritoneum/Retroperitoneum: The aorta is normal caliber.  No lymphadenopathy.    Bones/Soft Tissues: No suspicious osseous lesions  Impression: 1.  Wall thickening and inflammatory change involving the cecum and terminal  ileum consistent with inflammatory or infectious colitis/enteritis.  Normal  appendix.  CT HEAD WO CONTRAST  Narrative: EXAMINATION:  CT OF THE HEAD WITHOUT CONTRAST  3/18/2025 4:21 pm    TECHNIQUE:  CT of the head was performed without the administration of intravenous  contrast. Automated exposure control, iterative reconstruction, and/or weight  based adjustment of the mA/kV was utilized to reduce the radiation dose to as  low as

## 2025-03-20 NOTE — PROGRESS NOTES
Clinical Pharmacy Note  Vancomycin Consult    Rich Zambrano is a 39 y.o. male ordered vancomycin for sepsis; consult received from Dr. Martino to manage therapy. Also receiving cefepime.    Allergies:  Patient has no known allergies.     Temp max:  Temp (24hrs), Av.3 °F (36.8 °C), Min:97.7 °F (36.5 °C), Max:98.8 °F (37.1 °C)      Recent Labs     25  1636 25  0343   WBC 3.4* 18.1*       Recent Labs     25  1636 25  0343 25  0527   BUN 8 6* 7   CREATININE 0.9 1.1 0.8*         Intake/Output Summary (Last 24 hours) at 3/20/2025 0807  Last data filed at 3/19/2025 1759  Gross per 24 hour   Intake 1440 ml   Output --   Net 1440 ml       Culture Results:  3/18/25 Blood Culture: NGTD   3/19/25 MRSA Nasal probe: negative     Ht Readings from Last 1 Encounters:   25 1.702 m (5' 7\")        Wt Readings from Last 1 Encounters:   25 105.2 kg (231 lb 14.8 oz)         Estimated Creatinine Clearance: 143 mL/min (A) (based on SCr of 0.8 mg/dL (L)).    Assessment:  Day # 3 of vancomycin.  Current regimen: 1750 mg every 12 hours  Vancomycin level: 11.2 mg/L  Predicted AUC: 486    Plan:  Continue current regimen.      Thank you for the consult.   Lian Crowe RP, PharmD, 3/20/2025 8:09 AM

## 2025-03-21 VITALS
HEART RATE: 73 BPM | SYSTOLIC BLOOD PRESSURE: 124 MMHG | BODY MASS INDEX: 36.37 KG/M2 | WEIGHT: 231.7 LBS | RESPIRATION RATE: 18 BRPM | HEIGHT: 67 IN | DIASTOLIC BLOOD PRESSURE: 72 MMHG | TEMPERATURE: 97.8 F | OXYGEN SATURATION: 97 %

## 2025-03-21 LAB — CALPROTECTIN STL-MCNT: 500 UG/G

## 2025-03-21 PROCEDURE — 2580000003 HC RX 258: Performed by: INTERNAL MEDICINE

## 2025-03-21 PROCEDURE — 2500000003 HC RX 250 WO HCPCS

## 2025-03-21 PROCEDURE — 94660 CPAP INITIATION&MGMT: CPT

## 2025-03-21 PROCEDURE — 6360000002 HC RX W HCPCS: Performed by: INTERNAL MEDICINE

## 2025-03-21 PROCEDURE — 6370000000 HC RX 637 (ALT 250 FOR IP): Performed by: INTERNAL MEDICINE

## 2025-03-21 PROCEDURE — 6360000002 HC RX W HCPCS

## 2025-03-21 PROCEDURE — 94760 N-INVAS EAR/PLS OXIMETRY 1: CPT

## 2025-03-21 RX ORDER — PREDNISONE 10 MG/1
TABLET ORAL
Qty: 25 TABLET | Refills: 0 | Status: SHIPPED | OUTPATIENT
Start: 2025-03-21 | End: 2025-04-10

## 2025-03-21 RX ORDER — PREDNISONE 10 MG/1
TABLET ORAL
Qty: 25 TABLET | Refills: 0 | Status: SHIPPED | OUTPATIENT
Start: 2025-03-21 | End: 2025-03-21

## 2025-03-21 RX ORDER — PREDNISONE 20 MG/1
40 TABLET ORAL DAILY
Status: DISCONTINUED | OUTPATIENT
Start: 2025-03-21 | End: 2025-03-21 | Stop reason: HOSPADM

## 2025-03-21 RX ORDER — PREDNISONE 20 MG/1
40 TABLET ORAL DAILY
Qty: 30 TABLET | Refills: 0 | Status: SHIPPED | OUTPATIENT
Start: 2025-03-22 | End: 2025-03-21

## 2025-03-21 RX ORDER — WATER 10 ML/10ML
INJECTION INTRAMUSCULAR; INTRAVENOUS; SUBCUTANEOUS
Status: COMPLETED
Start: 2025-03-21 | End: 2025-03-21

## 2025-03-21 RX ADMIN — PREDNISONE 40 MG: 20 TABLET ORAL at 09:54

## 2025-03-21 RX ADMIN — CEFEPIME 2000 MG: 2 INJECTION, POWDER, FOR SOLUTION INTRAVENOUS at 10:02

## 2025-03-21 RX ADMIN — VANCOMYCIN 1750 MG: 1.25 INJECTION, SOLUTION INTRAVENOUS at 06:30

## 2025-03-21 RX ADMIN — WATER 10 ML: 1 INJECTION INTRAMUSCULAR; INTRAVENOUS; SUBCUTANEOUS at 04:49

## 2025-03-21 RX ADMIN — CEFEPIME 2000 MG: 2 INJECTION, POWDER, FOR SOLUTION INTRAVENOUS at 00:35

## 2025-03-21 RX ADMIN — METHYLPREDNISOLONE SODIUM SUCCINATE 20 MG: 40 INJECTION, POWDER, LYOPHILIZED, FOR SOLUTION INTRAMUSCULAR; INTRAVENOUS at 04:49

## 2025-03-21 ASSESSMENT — PAIN SCALES - GENERAL: PAINLEVEL_OUTOF10: 0

## 2025-03-21 NOTE — PROGRESS NOTES
Discharge order noted. Verbal and typed discharge education provided to patient. Patient verbalized understanding. Peripheral IV and heart monitor removed. Portable monitor returned to [facility]. Patient ambulated off of Unit independently to be taken home by family. Electronically signed by Alayna Goddard RN on 3/21/2025 at 2:16 PM

## 2025-03-21 NOTE — PROGRESS NOTES
Progress Note    Patient Rich Zambrano  MRN: [id number]  YOB: 1985 Age: 39 y.o. Sex: male  Room: [place] [address]       Admitting Physician: Rajesh Martino MD   Date of Admission: 3/18/2025  3:58 PM   Primary Care Physician: Nina Titus MD     Subjective:  Rich Zambrano was seen and examined. We are following for flare of Crohn's disease.  -- Patient is doing significantly better.  He had only 2 loose bowel movements a day.  Denies noticing blood in the stools.  Denies abdominal pain.  Ambulating in room.      Objective:  Vital Signs:   Vitals:    03/21/25 1000   BP: 124/72   Pulse: 73   Resp: 18   Temp: 97.8 °F (36.6 °C)   SpO2: 97%         Physical Exam:  Constitutional: Alert and oriented x 4. No acute distress.   Intake/Output:    Intake/Output Summary (Last 24 hours) at 3/21/2025 1222  Last data filed at 3/21/2025 1005  Gross per 24 hour   Intake 520 ml   Output --   Net 520 ml        Current Medications:  Current Facility-Administered Medications   Medication Dose Route Frequency Provider Last Rate Last Admin    predniSONE (DELTASONE) tablet 40 mg  40 mg Oral Daily Jensen Gottlieb MD   40 mg at 03/21/25 0954    sodium chloride flush 0.9 % injection 5-40 mL  5-40 mL IntraVENous 2 times per day Rajesh Martino MD   10 mL at 03/20/25 2045    sodium chloride flush 0.9 % injection 5-40 mL  5-40 mL IntraVENous PRN Rajesh Martino MD        0.9 % sodium chloride infusion   IntraVENous PRN Rajesh Martino MD 25 mL/hr at 03/20/25 1626 25 mL/hr at 03/20/25 1626    potassium chloride (KLOR-CON M) extended release tablet 40 mEq  40 mEq Oral PRN Rajesh Martino MD   40 mEq at 03/19/25 0508    Or    potassium bicarb-citric acid (EFFER-K) effervescent tablet 40 mEq  40 mEq Oral PRN Rajesh Martino MD        Or    potassium chloride 10 mEq/100 mL IVPB (Peripheral Line)  10 mEq IntraVENous PRN Rajesh Martino MD        magnesium sulfate 2000 mg in 50 mL  However, Remicade was not approved by his insurance and he has been off treatment since August. He developed a flare of abdominal pain and diarrhea. Denies noticing blood in the stools. He was prescribed oral steroids as an outpatient however the symptoms became significantly worse and he came into the emergency room. Patient was admitted. CT scan of the abdomen pelvis shows wall thickening and inflammatory changes involving the cecum and the terminal ileum consistent with enteritis/colitis. Patient is currently on antibiotics.  IV steroids started 3 days ago.  He is doing significantly better this.  Stool studies negative.  Fecal leukocyte positive.  Fecal calprotectin pending.  Change to p.o. prednisone.     Plan:  Stable for discharge from a GI viewpoint  Prednisone taper  Follow-up with Dr. Vergara in the next 1 to 2 weeks to discuss treatment with [facility].  4.  Patient being discharged today.  We will sign off.  Jensen Gottlieb MD    [facility]    [phone]. Also available via [employer]    Please note that some or all of this record was generated using voice recognition software. If there are any questions about the content of this document, please contact the author as some errors in translation may have occurred.

## 2025-03-21 NOTE — PROGRESS NOTES
[facility] a  [facility]nisone script to [facility] after pt  was  discharged from hospital , I spoke to pt a to let hime know about script and to let him know that there is also a  script for the same medication at  [facility] that was sent  3-18-25, pt was going to  go pick that script up. Also told  him if he has any issues to call use back here at  retail

## 2025-03-21 NOTE — PLAN OF CARE
Problem: Discharge Planning  Goal: Discharge to home or other facility with appropriate resources  3/21/2025 1149 by Alayna Goddard RN  Outcome: Completed  Flowsheets (Taken 3/21/2025 1014)  Discharge to home or other facility with appropriate resources:   Identify barriers to discharge with patient and caregiver   Arrange for needed discharge resources and transportation as appropriate   Identify discharge learning needs (meds, wound care, etc)     Problem: Pain  Goal: Verbalizes/displays adequate comfort level or baseline comfort level  3/21/2025 1149 by Alayna Goddard RN  Outcome: Completed     Problem: ABCDS Injury Assessment  Goal: Absence of physical injury  Outcome: Completed     Problem: Gastrointestinal - Adult  Goal: Minimal or absence of nausea and vomiting  3/21/2025 1149 by Alayna Goddard RN  Outcome: Completed  Flowsheets (Taken 3/21/2025 1014)  Minimal or absence of nausea and vomiting: Administer IV fluids as ordered to ensure adequate hydration     Problem: Gastrointestinal - Adult  Goal: Maintains or returns to baseline bowel function  3/21/2025 1149 by Alayna Goddard RN  Outcome: Completed  Flowsheets (Taken 3/21/2025 1014)  Maintains or returns to baseline bowel function:   Assess bowel function   Encourage oral fluids to ensure adequate hydration   Administer IV fluids as ordered to ensure adequate hydration     Problem: Gastrointestinal - Adult  Goal: Maintains adequate nutritional intake  3/21/2025 1149 by Alayna Goddard, RN  Outcome: Completed  Flowsheets (Taken 3/21/2025 1014)  Maintains adequate nutritional intake:   Monitor percentage of each meal consumed   Identify factors contributing to decreased intake, treat as appropriate

## 2025-03-21 NOTE — PLAN OF CARE
Problem: Discharge Planning  Goal: Discharge to home or other facility with appropriate resources  3/21/2025 0219 by Patricia Gan RN  Outcome: Progressing  Flowsheets (Taken 3/20/2025 2039)  Discharge to home or other facility with appropriate resources:   Identify barriers to discharge with patient and caregiver   Identify discharge learning needs (meds, wound care, etc)  3/20/2025 1544 by Ella Liang RN  Outcome: Progressing  Note: Prior to admission pt was living at home with his wife and independent in his care. Plan is to return to that environment.     Problem: Pain  Goal: Verbalizes/displays adequate comfort level or baseline comfort level  3/21/2025 0219 by Patricia Gan RN  Outcome: Progressing  3/20/2025 1544 by Ella Liang RN  Outcome: Progressing  Note: Pain/discomfort being managed with PRN analgesics per MD orders. Pt able to express presence and absence of pain and rate pain appropriately using numerical scale.      Problem: ABCDS Injury Assessment  Goal: Absence of physical injury  3/20/2025 1544 by Ella Liang RN  Outcome: Progressing  Note: No signs of physical injury.     Problem: Gastrointestinal - Adult  Goal: Minimal or absence of nausea and vomiting  3/21/2025 0219 by Patricia Gan RN  Outcome: Progressing  3/20/2025 1544 by Ella Liang RN  Outcome: Progressing  Note: No complaints of nausea this shift and is tolerating prescribed diet.  Goal: Maintains or returns to baseline bowel function  3/21/2025 0219 by Patricia Gan RN  Outcome: Progressing  3/20/2025 1544 by Ella Liang RN  Outcome: Progressing  Note: Pt did have a bowel movement this shift. Abdomen soft, bowel sounds positive no tenderness or guarding.  Goal: Maintains adequate nutritional intake  3/21/2025 0219 by Patricia Gan RN  Outcome: Progressing  3/20/2025 1544 by Ella Liang RN  Outcome: Progressing  Note: Pt is tolerating regular diet. He

## 2025-03-21 NOTE — CARE COORDINATION
Discharge order noted. Chart reviewed. Patient is going home on PO medications. If there are interim needs we can address please call for assistance. We are on site until 1700 today.     Respectfully submitted,    Sachi ALMANZAR, KLARISSA  [facility]   [phone]    Electronically signed by JENELLE Feng, CANDY on 3/21/2025 at 11:58 AM

## 2025-03-22 ENCOUNTER — RESULTS FOLLOW-UP (OUTPATIENT)
Dept: EMERGENCY DEPT | Age: 40
End: 2025-03-22

## 2025-03-22 LAB
BACTERIA BLD CULT ORG #2: NORMAL
BACTERIA BLD CULT: NORMAL

## 2025-03-24 ENCOUNTER — TELEPHONE (OUTPATIENT)
Dept: FAMILY MEDICINE CLINIC | Age: 40
End: 2025-03-24

## 2025-03-24 NOTE — TELEPHONE ENCOUNTER
Care Transitions Initial Follow Up Call    Outreach made within 2 business days of discharge: Yes    Patient: Rich Zambrano Patient : 1985   MRN: [id number]  Reason for Admission: sepsis  Discharge Date: 3/21/25       Spoke with: pt    Discharge department/facility: [facility] Interactive Patient Contact:  Was patient able to fill all prescriptions: Yes  Was patient instructed to bring all medications to the follow-up visit: No: no appt scheduled  Is patient taking all medications as directed in the discharge summary? Yes  Does patient understand their discharge instructions: Yes  Does patient have questions or concerns that need addressed prior to 7-14 day follow up office visit: no    Additional needs identified to be addressed with provider  No needs identified             Pt declined appt w pcp at this time  He is to follow up with GI. Does not have this scheduled yet but advised him to call Dr Vergara's office to do so.     Follow Up  No future appointments.    Nayeli Orlando MA

## 2025-07-03 ENCOUNTER — TELEPHONE (OUTPATIENT)
Dept: ORTHOPEDIC SURGERY | Age: 40
End: 2025-07-03

## 2025-07-03 NOTE — TELEPHONE ENCOUNTER
----- Message from Ijeoma MALONEY sent at 7/3/2025 10:00 AM EDT -----  Regarding: Specialty Message to Provider  Specialty Message to Provider    Relationship to Patient: Self     Patient Message: REQUESTING A REFILL FOR GABAPENTIN.  PHARMACY: [facility] [address]  --------------------------------------------------------------------------------------------------------------------------    Call Back Information: OK to leave message on voicemail  Preferred Call Back Number: [phone]

## 2025-07-03 NOTE — TELEPHONE ENCOUNTER
I spoke to pt and offered him an appt with Wiley Santos. He states he already has an appt at [place]. I let him know we can not prescribe any further medication without an appt with a provider.

## 2025-08-06 ENCOUNTER — HOSPITAL ENCOUNTER (EMERGENCY)
Age: 40
Discharge: HOME | End: 2025-08-06
Payer: COMMERCIAL

## 2025-08-06 VITALS
TEMPERATURE: 97.7 F | HEART RATE: 78 BPM | HEIGHT: 67 IN | OXYGEN SATURATION: 99 % | SYSTOLIC BLOOD PRESSURE: 126 MMHG | WEIGHT: 235.45 LBS | DIASTOLIC BLOOD PRESSURE: 75 MMHG | BODY MASS INDEX: 36.96 KG/M2 | RESPIRATION RATE: 16 BRPM

## 2025-08-06 DIAGNOSIS — G89.29 CHRONIC RIGHT-SIDED LOW BACK PAIN WITH SCIATICA, SCIATICA LATERALITY UNSPECIFIED: Primary | ICD-10-CM

## 2025-08-06 DIAGNOSIS — M54.40 CHRONIC RIGHT-SIDED LOW BACK PAIN WITH SCIATICA, SCIATICA LATERALITY UNSPECIFIED: Primary | ICD-10-CM

## 2025-08-06 PROCEDURE — 96372 THER/PROPH/DIAG INJ SC/IM: CPT

## 2025-08-06 PROCEDURE — 6370000000 HC RX 637 (ALT 250 FOR IP)

## 2025-08-06 PROCEDURE — 99284 EMERGENCY DEPT VISIT MOD MDM: CPT

## 2025-08-06 PROCEDURE — 6360000002 HC RX W HCPCS

## 2025-08-06 RX ORDER — HYDROCODONE BITARTRATE AND ACETAMINOPHEN 5; 325 MG/1; MG/1
1 TABLET ORAL EVERY 8 HOURS PRN
Qty: 9 TABLET | Refills: 0 | Status: SHIPPED | OUTPATIENT
Start: 2025-08-06 | End: 2025-08-06

## 2025-08-06 RX ORDER — KETOROLAC TROMETHAMINE 15 MG/ML
15 INJECTION, SOLUTION INTRAMUSCULAR; INTRAVENOUS ONCE
Status: COMPLETED | OUTPATIENT
Start: 2025-08-06 | End: 2025-08-06

## 2025-08-06 RX ORDER — LIDOCAINE 4 G/G
1 PATCH TOPICAL DAILY
Status: DISCONTINUED | OUTPATIENT
Start: 2025-08-06 | End: 2025-08-06 | Stop reason: HOSPADM

## 2025-08-06 RX ORDER — HYDROCODONE BITARTRATE AND ACETAMINOPHEN 5; 325 MG/1; MG/1
1 TABLET ORAL EVERY 8 HOURS PRN
Qty: 9 TABLET | Refills: 0 | Status: SHIPPED | OUTPATIENT
Start: 2025-08-06 | End: 2025-08-09

## 2025-08-06 RX ORDER — LIDOCAINE 50 MG/G
1 PATCH TOPICAL DAILY
Qty: 10 PATCH | Refills: 0 | Status: SHIPPED | OUTPATIENT
Start: 2025-08-06 | End: 2025-08-16

## 2025-08-06 RX ADMIN — KETOROLAC TROMETHAMINE 15 MG: 15 INJECTION, SOLUTION INTRAMUSCULAR; INTRAVENOUS at 09:14

## 2025-08-06 ASSESSMENT — LIFESTYLE VARIABLES
HOW OFTEN DO YOU HAVE A DRINK CONTAINING ALCOHOL: NEVER
HOW MANY STANDARD DRINKS CONTAINING ALCOHOL DO YOU HAVE ON A TYPICAL DAY: PATIENT DOES NOT DRINK

## 2025-08-06 ASSESSMENT — PAIN - FUNCTIONAL ASSESSMENT
PAIN_FUNCTIONAL_ASSESSMENT: 0-10
PAIN_FUNCTIONAL_ASSESSMENT: 0-10

## 2025-08-06 ASSESSMENT — PAIN SCALES - GENERAL
PAINLEVEL_OUTOF10: 8
PAINLEVEL_OUTOF10: 5
PAINLEVEL_OUTOF10: 10
PAINLEVEL_OUTOF10: 10

## 2025-08-06 ASSESSMENT — PAIN DESCRIPTION - LOCATION: LOCATION: BACK;LEG

## 2025-08-06 ASSESSMENT — PAIN DESCRIPTION - ORIENTATION: ORIENTATION: RIGHT;LOWER

## 2025-08-31 ENCOUNTER — HOSPITAL ENCOUNTER (EMERGENCY)
Age: 40
Discharge: HOME | End: 2025-08-31
Payer: COMMERCIAL

## 2025-08-31 VITALS
HEIGHT: 69 IN | SYSTOLIC BLOOD PRESSURE: 118 MMHG | HEART RATE: 72 BPM | DIASTOLIC BLOOD PRESSURE: 75 MMHG | TEMPERATURE: 98 F | OXYGEN SATURATION: 99 % | RESPIRATION RATE: 16 BRPM | BODY MASS INDEX: 36.02 KG/M2 | WEIGHT: 243.17 LBS

## 2025-08-31 DIAGNOSIS — M54.31 SCIATICA OF RIGHT SIDE: Primary | ICD-10-CM

## 2025-08-31 PROCEDURE — 99284 EMERGENCY DEPT VISIT MOD MDM: CPT

## 2025-08-31 PROCEDURE — 96372 THER/PROPH/DIAG INJ SC/IM: CPT

## 2025-08-31 PROCEDURE — 96374 THER/PROPH/DIAG INJ IV PUSH: CPT

## 2025-08-31 PROCEDURE — 6360000002 HC RX W HCPCS: Performed by: PHYSICIAN ASSISTANT

## 2025-08-31 RX ORDER — CYCLOBENZAPRINE HCL 10 MG
10 TABLET ORAL 3 TIMES DAILY PRN
Qty: 30 TABLET | Refills: 0 | Status: SHIPPED | OUTPATIENT
Start: 2025-08-31

## 2025-08-31 RX ORDER — ONDANSETRON 2 MG/ML
4 INJECTION INTRAMUSCULAR; INTRAVENOUS ONCE
Status: COMPLETED | OUTPATIENT
Start: 2025-08-31 | End: 2025-08-31

## 2025-08-31 RX ORDER — PREDNISONE 20 MG/1
TABLET ORAL
Qty: 18 TABLET | Refills: 0 | Status: SHIPPED | OUTPATIENT
Start: 2025-08-31

## 2025-08-31 RX ORDER — ORPHENADRINE CITRATE 30 MG/ML
60 INJECTION INTRAMUSCULAR; INTRAVENOUS ONCE
Status: COMPLETED | OUTPATIENT
Start: 2025-08-31 | End: 2025-08-31

## 2025-08-31 RX ORDER — KETOROLAC TROMETHAMINE 30 MG/ML
30 INJECTION, SOLUTION INTRAMUSCULAR; INTRAVENOUS ONCE
Status: COMPLETED | OUTPATIENT
Start: 2025-08-31 | End: 2025-08-31

## 2025-08-31 RX ORDER — MORPHINE SULFATE 4 MG/ML
4 INJECTION, SOLUTION INTRAMUSCULAR; INTRAVENOUS ONCE
Status: COMPLETED | OUTPATIENT
Start: 2025-08-31 | End: 2025-08-31

## 2025-08-31 RX ORDER — DEXAMETHASONE SODIUM PHOSPHATE 4 MG/ML
10 INJECTION, SOLUTION INTRA-ARTICULAR; INTRALESIONAL; INTRAMUSCULAR; INTRAVENOUS; SOFT TISSUE ONCE
Status: COMPLETED | OUTPATIENT
Start: 2025-08-31 | End: 2025-08-31

## 2025-08-31 RX ADMIN — ONDANSETRON 4 MG: 2 INJECTION, SOLUTION INTRAMUSCULAR; INTRAVENOUS at 13:23

## 2025-08-31 RX ADMIN — DEXAMETHASONE SODIUM PHOSPHATE 10 MG: 4 INJECTION INTRA-ARTICULAR; INTRALESIONAL; INTRAMUSCULAR; INTRAVENOUS; SOFT TISSUE at 13:16

## 2025-08-31 RX ADMIN — MORPHINE SULFATE 4 MG: 4 INJECTION, SOLUTION INTRAMUSCULAR; INTRAVENOUS at 13:13

## 2025-08-31 RX ADMIN — KETOROLAC TROMETHAMINE 15 MG: 30 INJECTION, SOLUTION INTRAMUSCULAR at 13:17

## 2025-08-31 RX ADMIN — ORPHENADRINE CITRATE 60 MG: 60 INJECTION INTRAMUSCULAR; INTRAVENOUS at 13:25

## 2025-08-31 ASSESSMENT — PAIN - FUNCTIONAL ASSESSMENT
PAIN_FUNCTIONAL_ASSESSMENT: 0-10
PAIN_FUNCTIONAL_ASSESSMENT: 0-10

## 2025-08-31 ASSESSMENT — LIFESTYLE VARIABLES
HOW MANY STANDARD DRINKS CONTAINING ALCOHOL DO YOU HAVE ON A TYPICAL DAY: PATIENT DOES NOT DRINK
HOW MANY STANDARD DRINKS CONTAINING ALCOHOL DO YOU HAVE ON A TYPICAL DAY: PATIENT DOES NOT DRINK
HOW OFTEN DO YOU HAVE A DRINK CONTAINING ALCOHOL: NEVER

## 2025-08-31 ASSESSMENT — PAIN SCALES - GENERAL
PAINLEVEL_OUTOF10: 9
PAINLEVEL_OUTOF10: 10
PAINLEVEL_OUTOF10: 2

## 2025-08-31 ASSESSMENT — PAIN DESCRIPTION - ORIENTATION: ORIENTATION: LOWER

## 2025-08-31 ASSESSMENT — PAIN DESCRIPTION - LOCATION: LOCATION: BACK

## 2025-08-31 ASSESSMENT — PAIN DESCRIPTION - DESCRIPTORS: DESCRIPTORS: SHARP;SHOOTING
